# Patient Record
Sex: FEMALE | Race: WHITE | NOT HISPANIC OR LATINO | Employment: UNEMPLOYED | URBAN - METROPOLITAN AREA
[De-identification: names, ages, dates, MRNs, and addresses within clinical notes are randomized per-mention and may not be internally consistent; named-entity substitution may affect disease eponyms.]

---

## 2017-03-05 DIAGNOSIS — M79.671 CHRONIC PAIN IN RIGHT FOOT: ICD-10-CM

## 2017-03-05 DIAGNOSIS — G89.29 CHRONIC PAIN IN RIGHT FOOT: ICD-10-CM

## 2017-04-05 ENCOUNTER — HOSPITAL ENCOUNTER (OUTPATIENT)
Dept: RADIOLOGY | Facility: MEDICAL CENTER | Age: 62
End: 2017-04-05
Attending: NURSE PRACTITIONER
Payer: COMMERCIAL

## 2017-04-05 DIAGNOSIS — S92.324A CLOSED NONDISPLACED FRACTURE OF SECOND METATARSAL BONE OF RIGHT FOOT, INITIAL ENCOUNTER: ICD-10-CM

## 2017-04-05 DIAGNOSIS — M25.374 INSTABILITY OF RIGHT FOOT JOINT: ICD-10-CM

## 2017-04-05 DIAGNOSIS — M79.671 RIGHT FOOT PAIN: ICD-10-CM

## 2017-04-05 PROCEDURE — 73700 CT LOWER EXTREMITY W/O DYE: CPT | Mod: RT

## 2017-05-11 ENCOUNTER — APPOINTMENT (OUTPATIENT)
Dept: ADMISSIONS | Facility: MEDICAL CENTER | Age: 62
End: 2017-05-11
Attending: ORTHOPAEDIC SURGERY
Payer: COMMERCIAL

## 2017-05-11 RX ORDER — IBUPROFEN 200 MG
200 TABLET ORAL EVERY 6 HOURS PRN
COMMUNITY

## 2017-05-15 ENCOUNTER — APPOINTMENT (OUTPATIENT)
Dept: RADIOLOGY | Facility: MEDICAL CENTER | Age: 62
End: 2017-05-15
Attending: ORTHOPAEDIC SURGERY
Payer: COMMERCIAL

## 2017-05-15 ENCOUNTER — HOSPITAL ENCOUNTER (OUTPATIENT)
Facility: MEDICAL CENTER | Age: 62
End: 2017-05-15
Attending: ORTHOPAEDIC SURGERY | Admitting: ORTHOPAEDIC SURGERY
Payer: COMMERCIAL

## 2017-05-15 VITALS
TEMPERATURE: 98.3 F | BODY MASS INDEX: 28.38 KG/M2 | HEART RATE: 80 BPM | WEIGHT: 166.23 LBS | RESPIRATION RATE: 16 BRPM | OXYGEN SATURATION: 98 % | HEIGHT: 64 IN

## 2017-05-15 PROBLEM — S92.324A NONDISPLACED FRACTURE OF SECOND METATARSAL BONE, RIGHT FOOT, INITIAL ENCOUNTER FOR CLOSED FRACTURE: Status: ACTIVE | Noted: 2017-05-15

## 2017-05-15 PROBLEM — M25.374: Status: ACTIVE | Noted: 2017-05-15

## 2017-05-15 PROBLEM — M79.671 FOOT PAIN, RIGHT: Status: ACTIVE | Noted: 2017-05-15

## 2017-05-15 PROCEDURE — 700111 HCHG RX REV CODE 636 W/ 250 OVERRIDE (IP)

## 2017-05-15 PROCEDURE — 160022 HCHG BLOCK: Performed by: ORTHOPAEDIC SURGERY

## 2017-05-15 PROCEDURE — 501480 HCHG STOCKINETTE: Performed by: ORTHOPAEDIC SURGERY

## 2017-05-15 PROCEDURE — 160048 HCHG OR STATISTICAL LEVEL 1-5: Performed by: ORTHOPAEDIC SURGERY

## 2017-05-15 PROCEDURE — 160028 HCHG SURGERY MINUTES - 1ST 30 MINS LEVEL 3: Performed by: ORTHOPAEDIC SURGERY

## 2017-05-15 PROCEDURE — 160046 HCHG PACU - 1ST 60 MINS PHASE II: Performed by: ORTHOPAEDIC SURGERY

## 2017-05-15 PROCEDURE — 500423 HCHG DRESSING, ABD COMBINE: Performed by: ORTHOPAEDIC SURGERY

## 2017-05-15 PROCEDURE — 500088 HCHG BLADE, SAGITTAL: Performed by: ORTHOPAEDIC SURGERY

## 2017-05-15 PROCEDURE — 160035 HCHG PACU - 1ST 60 MINS PHASE I: Performed by: ORTHOPAEDIC SURGERY

## 2017-05-15 PROCEDURE — 700101 HCHG RX REV CODE 250

## 2017-05-15 PROCEDURE — 160002 HCHG RECOVERY MINUTES (STAT): Performed by: ORTHOPAEDIC SURGERY

## 2017-05-15 PROCEDURE — 160009 HCHG ANES TIME/MIN: Performed by: ORTHOPAEDIC SURGERY

## 2017-05-15 PROCEDURE — C1713 ANCHOR/SCREW BN/BN,TIS/BN: HCPCS | Performed by: ORTHOPAEDIC SURGERY

## 2017-05-15 PROCEDURE — 700102 HCHG RX REV CODE 250 W/ 637 OVERRIDE(OP)

## 2017-05-15 PROCEDURE — 160039 HCHG SURGERY MINUTES - EA ADDL 1 MIN LEVEL 3: Performed by: ORTHOPAEDIC SURGERY

## 2017-05-15 PROCEDURE — 502240 HCHG MISC OR SUPPLY RC 0272: Performed by: ORTHOPAEDIC SURGERY

## 2017-05-15 PROCEDURE — 160036 HCHG PACU - EA ADDL 30 MINS PHASE I: Performed by: ORTHOPAEDIC SURGERY

## 2017-05-15 PROCEDURE — 500881 HCHG PACK, EXTREMITY: Performed by: ORTHOPAEDIC SURGERY

## 2017-05-15 PROCEDURE — 160047 HCHG PACU  - EA ADDL 30 MINS PHASE II: Performed by: ORTHOPAEDIC SURGERY

## 2017-05-15 PROCEDURE — 160025 RECOVERY II MINUTES (STATS): Performed by: ORTHOPAEDIC SURGERY

## 2017-05-15 PROCEDURE — A6454 SELF-ADHER BAND W>=3" <5"/YD: HCPCS | Performed by: ORTHOPAEDIC SURGERY

## 2017-05-15 PROCEDURE — 73630 X-RAY EXAM OF FOOT: CPT | Mod: RT

## 2017-05-15 PROCEDURE — 502000 HCHG MISC OR IMPLANTS RC 0278: Performed by: ORTHOPAEDIC SURGERY

## 2017-05-15 PROCEDURE — A6223 GAUZE >16<=48 NO W/SAL W/O B: HCPCS | Performed by: ORTHOPAEDIC SURGERY

## 2017-05-15 PROCEDURE — 501838 HCHG SUTURE GENERAL: Performed by: ORTHOPAEDIC SURGERY

## 2017-05-15 PROCEDURE — A9270 NON-COVERED ITEM OR SERVICE: HCPCS

## 2017-05-15 DEVICE — SCREW CORTEX SELF TAPPING VLP STERILE 3.5MMX26MM (3TX6+2TX4=26): Type: IMPLANTABLE DEVICE | Status: FUNCTIONAL

## 2017-05-15 DEVICE — IMPLANTABLE DEVICE: Type: IMPLANTABLE DEVICE | Status: FUNCTIONAL

## 2017-05-15 DEVICE — SCREW CORTEX SELF TAPPING VLP STERILE 3.5MMX40MM (3TX6+2TX4=26): Type: IMPLANTABLE DEVICE | Status: FUNCTIONAL

## 2017-05-15 DEVICE — SCREW CORTEX SELF TAPPING VLP STERILE 3.5MMX34MM (3TX6+2TX4=26): Type: IMPLANTABLE DEVICE | Status: FUNCTIONAL

## 2017-05-15 RX ORDER — MAGNESIUM HYDROXIDE 1200 MG/15ML
LIQUID ORAL
Status: DISCONTINUED | OUTPATIENT
Start: 2017-05-15 | End: 2017-05-15 | Stop reason: HOSPADM

## 2017-05-15 RX ORDER — LIDOCAINE HYDROCHLORIDE 10 MG/ML
INJECTION, SOLUTION INFILTRATION; PERINEURAL
Status: COMPLETED
Start: 2017-05-15 | End: 2017-05-15

## 2017-05-15 RX ORDER — LIDOCAINE HYDROCHLORIDE 10 MG/ML
0.5 INJECTION, SOLUTION INFILTRATION; PERINEURAL
Status: COMPLETED | OUTPATIENT
Start: 2017-05-15 | End: 2017-05-15

## 2017-05-15 RX ORDER — OXYCODONE HCL 5 MG/5 ML
SOLUTION, ORAL ORAL
Status: COMPLETED
Start: 2017-05-15 | End: 2017-05-15

## 2017-05-15 RX ORDER — LIDOCAINE HYDROCHLORIDE 10 MG/ML
0.5 INJECTION, SOLUTION INFILTRATION; PERINEURAL
Status: CANCELLED | OUTPATIENT
Start: 2017-05-15

## 2017-05-15 RX ORDER — SODIUM CHLORIDE, SODIUM LACTATE, POTASSIUM CHLORIDE, CALCIUM CHLORIDE 600; 310; 30; 20 MG/100ML; MG/100ML; MG/100ML; MG/100ML
INJECTION, SOLUTION INTRAVENOUS CONTINUOUS
Status: DISCONTINUED | OUTPATIENT
Start: 2017-05-15 | End: 2017-05-15 | Stop reason: HOSPADM

## 2017-05-15 RX ORDER — LIDOCAINE AND PRILOCAINE 25; 25 MG/G; MG/G
1 CREAM TOPICAL
Status: COMPLETED | OUTPATIENT
Start: 2017-05-15 | End: 2017-05-15

## 2017-05-15 RX ORDER — LIDOCAINE AND PRILOCAINE 25; 25 MG/G; MG/G
1 CREAM TOPICAL
Status: CANCELLED | OUTPATIENT
Start: 2017-05-15

## 2017-05-15 RX ADMIN — OXYCODONE HYDROCHLORIDE 5 MG: 5 SOLUTION ORAL at 11:08

## 2017-05-15 RX ADMIN — OXYCODONE HYDROCHLORIDE 5 MG: 5 SOLUTION ORAL at 12:02

## 2017-05-15 RX ADMIN — LIDOCAINE HYDROCHLORIDE 0.5 ML: 10 INJECTION, SOLUTION INFILTRATION; PERINEURAL at 07:24

## 2017-05-15 RX ADMIN — SODIUM CHLORIDE, SODIUM LACTATE, POTASSIUM CHLORIDE, CALCIUM CHLORIDE: 600; 310; 30; 20 INJECTION, SOLUTION INTRAVENOUS at 07:24

## 2017-05-15 ASSESSMENT — PAIN SCALES - GENERAL
PAINLEVEL_OUTOF10: 2
PAINLEVEL_OUTOF10: 0
PAINLEVEL_OUTOF10: 2
PAINLEVEL_OUTOF10: 0
PAINLEVEL_OUTOF10: 1
PAINLEVEL_OUTOF10: 3
PAINLEVEL_OUTOF10: 0
PAINLEVEL_OUTOF10: 2
PAINLEVEL_OUTOF10: 0

## 2017-05-15 NOTE — OR NURSING
Pt A&Ox4. VSS on RA. Pt sitting up in [place], using IS. Denies nausea. Pt c/o discomfort in right thigh that is tolerable. Report called to GARY Zuleta in PACU II and pt transferred.

## 2017-05-15 NOTE — IP AVS SNAPSHOT
5/15/2017    María Ruelas Bernadine  [address] [place] [address]    Dear María:    [facility] wants to ensure your discharge home is safe and you or your loved ones have had all of your questions answered regarding your care after you leave the hospital.    Below is a list of resources and contact information should you have any questions regarding your hospital stay, follow-up instructions, or active medical symptoms.    Questions or Concerns Regarding… Contact   Medical Questions Related to Your Discharge  (7 days a week, 8am-5pm) Contact a Nurse Care Coordinator   [phone]   Medical Questions Not Related to Your Discharge  (24 hours a day / 7 days a week)  Contact the Nurse Health Line   [phone]    Medications or Discharge Instructions Refer to your discharge packet   or contact your [facility] Primary Care Provider   [phone]   Follow-up Appointment(s) Schedule your appointment via [employer]   or contact Scheduling [phone]   Billing Review your statement via [employer]  or contact Billing [phone]   Medical Records Review your records via [employer]   or contact Medical Records [phone]     You may receive a telephone call within two days of discharge. This call is to make certain you understand your discharge instructions and have the opportunity to have any questions answered. You can also easily access your medical information, test results and upcoming appointments via the [employer] free online health management tool. You can learn more and sign up at [employer]. For assistance setting up your [employer] account, please call [phone].    Once again, we want to ensure your discharge home is safe and that you have a clear understanding of any next steps in your care. If you have any questions or concerns, please do not hesitate to contact us, we are here for you. Thank you for choosing [facility] for your healthcare needs.    Sincerely,    Your [facility] Healthcare Team

## 2017-05-15 NOTE — IP AVS SNAPSHOT
" Home Care Instructions                                                                                                                Name:María Colindres  Medical Record Number:[id number]  CSN: [id number]    YOB: 1955   Age: 61 y.o.  Sex: female  HT:1.626 m (5' 4\") WT: 75.4 kg (166 lb 3.6 oz)          Admit Date: 5/15/2017     Discharge Date:   Today's Date: 5/15/2017  Attending Doctor:  Andrade Harrison M.D.                  Allergies:  Codeine; Food; and Latex              Follow-up Information     1. Follow up with Andrade Harrison M.D. In 2 weeks.    Specialty:  Orthopaedics    Contact information    [address]  [address]  [facility] [address]  [phone]          Discharge Instructions         ACTIVITY: Rest and take it easy for the first 24 hours.  A responsible adult is recommended to remain with you during that time.  It is normal to feel sleepy.  We encourage you to not do anything that requires balance, judgment or coordination.    MILD FLU-LIKE SYMPTOMS ARE NORMAL. YOU MAY EXPERIENCE GENERALIZED MUSCLE ACHES, THROAT IRRITATION, HEADACHE AND/OR SOME NAUSEA.    FOR 24 HOURS DO NOT:  Drive, operate machinery or run household appliances.  Drink beer or alcoholic beverages.   Make important decisions or sign legal documents.    SPECIAL INSTRUCTIONS:   Elevate/ice  rewrap ace looser 6-8 hours post op  Take pain medications scheduled until block wears off.  Hold for sedation.             DIET: To avoid nausea, slowly advance diet as tolerated, avoiding spicy or greasy foods for the first day.  Add more substantial food to your diet according to your physician's instructions.  Babies can be fed formula or breast milk as soon as they are hungry.  INCREASE FLUIDS AND FIBER TO AVOID CONSTIPATION.        FOLLOW-UP APPOINTMENT:  A follow-up appointment should be arranged with your doctor call to schedule.    You should CALL YOUR PHYSICIAN if you develop:  Fever greater than 101 degrees F.  Pain not relieved " by medication, or persistent nausea or vomiting.  Excessive bleeding (blood soaking through dressing) or unexpected drainage from the wound.  Extreme redness or swelling around the incision site, drainage of pus or foul smelling drainage.  Inability to urinate or empty your bladder within 8 hours.  Problems with breathing or chest pain.    You should call 911 if you develop problems with breathing or chest pain.  If you are unable to contact your doctor or surgical center, you should go to the nearest emergency room or urgent care center.  Physician's telephone #: [phone]    If any questions arise, call your doctor.  If your doctor is not available, please feel free to call the Surgical Center at [phone].  The Center is open Monday through Friday from 7AM to 7PM.  You can also call the [employer] HOTLINE open 24 hours/day, 7 days/week and speak to a nurse at [phone], or toll free at [phone].    A registered nurse may call you a few days after your surgery to see how you are doing after your procedure.    MEDICATIONS: Resume taking daily medication.  Take prescribed pain medication with food.  If no medication is prescribed, you may take non-aspirin pain medication if needed.  PAIN MEDICATION CAN BE VERY CONSTIPATING.  Take a stool softener or laxative such as senokot, pericolace, or milk of magnesia if needed.    Prescription given for pain Last pain medication given at 5252.    If your physician has prescribed pain medication that includes Acetaminophen (Tylenol), do not take additional Acetaminophen (Tylenol) while taking the prescribed medication.    Depression / Suicide Risk    As you are discharged from this [facility] facility, it is important to learn how to keep safe from harming yourself.    Recognize the warning signs:  · Abrupt changes in personality, positive or negative- including increase in energy   · Giving away possessions  · Change in eating patterns- significant weight changes-   positive or negative  · Change in sleeping patterns- unable to sleep or sleeping all the time   · Unwillingness or inability to communicate  · Depression  · Unusual sadness, discouragement and loneliness  · Talk of wanting to die  · Neglect of personal appearance   · Rebelliousness- reckless behavior  · Withdrawal from people/activities they love  · Confusion- inability to concentrate     If you or a loved one observes any of these behaviors or has concerns about self-harm, here's what you can do:  · Talk about it- your feelings and reasons for harming yourself  · Remove any means that you might use to hurt yourself (examples: pills, rope, extension cords, firearm)  · Get professional help from the community (Mental Health, Substance Abuse, psychological counseling)  · Do not be alone:Call your Safe Contact- someone whom you trust who will be there for you.  · Call your local CRISIS HOTLINE 507-0195 or 391-251-2814  · Call your local Children's Mobile Crisis Response Team [place] (380) 414-6945 or www.[employer]  · Call the toll free National Suicide Prevention Hotlines   · National Suicide Prevention Lifeline 080-002-LSBU (7401)  · National Hope Line Network 800-SUICIDE (470-2821)       Medication List      CONTINUE taking these medications        Instructions    Morning Afternoon Evening Bedtime    ibuprofen 200 MG Tabs   Commonly known as:  MOTRIN        Take 200 mg by mouth every 6 hours as needed.   Dose:  200 mg                        XIIDRA 5 % Soln   Generic drug:  Lifitegrast        Place 1 Drop in both eyes 2 Times a Day.   Dose:  1 Drop                                Medication Information     Above and/or attached are the medications your physician expects you to take upon discharge. Review all of your home medications and newly ordered medications with your doctor and/or pharmacist. Follow medication instructions as directed by your doctor and/or pharmacist. Please keep your medication list  with you and share with your physician. Update the information when medications are discontinued, doses are changed, or new medications (including over-the-counter products) are added; and carry medication information at all times in the event of emergency situations.        Resources     Quit Smoking / Tobacco Use:    I understand the use of any tobacco products increases my chance of suffering from future heart disease or stroke and could cause other illnesses which may shorten my life. Quitting the use of tobacco products is the single most important thing I can do to improve my health. For further information on smoking / tobacco cessation call a Toll Free Quit Line at 1-986.736.3375 (*National Cancer [place]) or 1-352.972.6866 (American Lung Association) or you can access the web based program at www.lungusa.org.    [place] Tobacco Users Help Line:  (260) 374-2840       Toll Free: 1-454.814.4372  Quit Tobacco Program [facility] (098)925-2633    Crisis Hotline:    [place] Crisis Hotline:  8-679-QQISBSI or 1-396.731.7157    [place] Crisis Hotline:    1-376.982.6957 or 201-467-5103    Discharge Survey:   Thank you for choosing [facility]. We hope we did everything we could to make your hospital stay a pleasant one. You may be receiving a survey and we would appreciate your time and participation in answering the questions. Your input is very valuable to us in our efforts to improve our service to our patients and their families.            Signatures     My signature on this form indicates that:    1. I acknowledge receipt and understanding of these Home Care Instruction.  2. My questions regarding this information have been answered to my satisfaction.  3. I have formulated a plan with my discharge nurse to obtain my prescribed medications for home.    __________________________________      __________________________________                   Patient Signature                                  Guardian/Responsible Adult Signature      __________________________________                 __________       ________                       Nurse Signature                                               Date                 Time

## 2017-05-15 NOTE — DISCHARGE INSTRUCTIONS
ACTIVITY: Rest and take it easy for the first 24 hours.  A responsible adult is recommended to remain with you during that time.  It is normal to feel sleepy.  We encourage you to not do anything that requires balance, judgment or coordination.    MILD FLU-LIKE SYMPTOMS ARE NORMAL. YOU MAY EXPERIENCE GENERALIZED MUSCLE ACHES, THROAT IRRITATION, HEADACHE AND/OR SOME NAUSEA.    FOR 24 HOURS DO NOT:  Drive, operate machinery or run household appliances.  Drink beer or alcoholic beverages.   Make important decisions or sign legal documents.    SPECIAL INSTRUCTIONS:   Elevate/ice  rewrap ace looser 6-8 hours post op  Take pain medications scheduled until block wears off.  Hold for sedation.             DIET: To avoid nausea, slowly advance diet as tolerated, avoiding spicy or greasy foods for the first day.  Add more substantial food to your diet according to your physician's instructions.  Babies can be fed formula or breast milk as soon as they are hungry.  INCREASE FLUIDS AND FIBER TO AVOID CONSTIPATION.        FOLLOW-UP APPOINTMENT:  A follow-up appointment should be arranged with your doctor call to schedule.    You should CALL YOUR PHYSICIAN if you develop:  Fever greater than 101 degrees F.  Pain not relieved by medication, or persistent nausea or vomiting.  Excessive bleeding (blood soaking through dressing) or unexpected drainage from the wound.  Extreme redness or swelling around the incision site, drainage of pus or foul smelling drainage.  Inability to urinate or empty your bladder within 8 hours.  Problems with breathing or chest pain.    You should call 911 if you develop problems with breathing or chest pain.  If you are unable to contact your doctor or surgical center, you should go to the nearest emergency room or urgent care center.  Physician's telephone #: [phone]    If any questions arise, call your doctor.  If your doctor is not available, please feel free to call the Surgical Center at  [phone].  The Center is open Monday through Friday from 7AM to 7PM.  You can also call the HEALTH HOTLINE open 24 hours/day, 7 days/week and speak to a nurse at [phone], or toll free at [phone].    A registered nurse may call you a few days after your surgery to see how you are doing after your procedure.    MEDICATIONS: Resume taking daily medication.  Take prescribed pain medication with food.  If no medication is prescribed, you may take non-aspirin pain medication if needed.  PAIN MEDICATION CAN BE VERY CONSTIPATING.  Take a stool softener or laxative such as senokot, pericolace, or milk of magnesia if needed.    Prescription given for pain Last pain medication given at 1202.    If your physician has prescribed pain medication that includes Acetaminophen (Tylenol), do not take additional Acetaminophen (Tylenol) while taking the prescribed medication.    Depression / Suicide Risk    As you are discharged from this [facility] facility, it is important to learn how to keep safe from harming yourself.    Recognize the warning signs:  · Abrupt changes in personality, positive or negative- including increase in energy   · Giving away possessions  · Change in eating patterns- significant weight changes-  positive or negative  · Change in sleeping patterns- unable to sleep or sleeping all the time   · Unwillingness or inability to communicate  · Depression  · Unusual sadness, discouragement and loneliness  · Talk of wanting to die  · Neglect of personal appearance   · Rebelliousness- reckless behavior  · Withdrawal from people/activities they love  · Confusion- inability to concentrate     If you or a loved one observes any of these behaviors or has concerns about self-harm, here's what you can do:  · Talk about it- your feelings and reasons for harming yourself  · Remove any means that you might use to hurt yourself (examples: pills, rope, extension cords, firearm)  · Get professional help from  the community (Mental Health, Substance Abuse, psychological counseling)  · Do not be alone:Call your Safe Contact- someone whom you trust who will be there for you.  · Call your local CRISIS HOTLINE 565-4805 or 228-147-0642  · Call your local Children's Mobile Crisis Response Team [place] (619) 675-8628 or www.[employer]  · Call the toll free National Suicide Prevention Hotlines   · National Suicide Prevention Lifeline 100-879-BTZI (6416)  · National Hope Line Network 800-SUICIDE (487-1778)

## 2017-05-15 NOTE — OP REPORT
DATE OF SERVICE:  05/15/2017    DATE OF OPERATION:  05/15/2017    PREOPERATIVE DIAGNOSIS:  Right chronic Lisfranc dislocation.    POSTOPERATIVE DIAGNOSIS:  Right chronic Lisfranc dislocation.    PROCEDURE PERFORMED:  1.  Right open reduction and internal fixation Lisfranc disruption.  2.  Right fusion, first and second tarsometatarsal joint.  3.  Right minor bone grafting.    SURGEON:  Andrade Harrison MD    ASSISTANT:  Cristine Barney MD    SECOND ASSISTANT:  JAYLYN No    ANESTHESIA:  General endotracheal with popliteal block per my request for   postoperative pain management.    ESTIMATED BLOOD LOSS:  None.    COMPLICATIONS:  None.    POSTOPERATIVE PLAN:  1.  Nonweightbearing.  2.  Preoperative antibiotics.  3.  Follow up in 2 weeks.    INDICATIONS:  The patient is a pleasant 61-year-old female who has had   problems with her right foot following a trauma.  The above diagnosis was made   and options were discussed with her including operative and nonoperative.    She elected to undergo operative intervention.  The above procedure discussed   and all questions were answered.  Risks of surgery explained to include but   not limited to wound problems, infection, nerve injury, vascular injury, need   for further surgery.  She understands she could have persistent risk for pain,   malunion, nonunion, need for further surgery.  She understands and accepts   these risks and agrees to proceed.  Site was marked by myself prior to   receiving psychotropic medicines.    PROCEDURE IN DETAIL:  The patient was given a popliteal block per my request   for postoperative pain management.  She was brought into the operating room   and underwent general endotracheal anesthesia without complications.  Right   lower extremity was prepped and draped in standard fashion.  Positive site   verification confirmed her right lower extremity as well as the above   procedure and confirmation that she received preoperative  antibiotics.    Esmarch was used to exsanguinate her foot and ankle and leg tourniquet was   inflated to 250 mmHg.  Curvilinear incision was made over the dorsal aspect of   her foot.  It was carefully dissected down, freeing up soft tissue.  The EDB   was identified and elevated up.  This was then identified the underlying   neurovascular structures.  These were mobilized.  A 15 blade was then used to   release the soft tissue over the first tarsometatarsal Lisfranc and second   tarsometatarsal.  Using curette and osteotomes, all cartilage was removed.  It   is down to good subchondral bone.  All soft tissue was removed.  The wounds   were then morselized.  The Lisfranc was then reduced as confirmed on C-arm   imaging of the first TMT was reduced.  It was provisionally held with K wire.    A 3.5 screw was placed in lag screw fashion from the first metatarsal into   the medial cuneiform.  Second lag screw was placed across the Lisfranc from   the base of the second into the medial cuneiform.  It had excellent fixation.    Finally, P[employer]a [employer]hew VLP X plate was placed from the medial, middle,   base of the second base of the first metatarsal.  All had excellent fixation.    Final C-arm imaging demonstrates satisfactory position for internal fixation   alignment of the foot.  Wounds were irrigated with copious irrigation.  A stab   incision was then made over the calcaneus.  It was bluntly dissected down.    Using a 4.5 drill sleeve, multiple cores of bone were taken from the heel and   placed.  These were morselized and placed into and around the fusion site.    Wounds were irrigated with copious irrigation and closed in layered fashion   with 3-0 Vicryl and 3-0 nylon.  Sterile dressings were applied.  Tourniquet   was released.  All toes were pink.  She was transferred to recovery room in   good condition.    Utilization of Dr. Barney was necessary for patient positioning, holding,   retracting, provisional and  definitive fixation, wound closure, dressing and   splint placement.  She was present throughout the entire procedure.       ____________________________________     MD AUSTYN FLOREZ / DONNA    DD:  05/15/2017 10:55:54  DT:  05/15/2017 13:26:33    D#:  [id number]  Job#:  [id number]    cc: [facility]

## 2017-10-23 ENCOUNTER — APPOINTMENT (OUTPATIENT)
Dept: RADIOLOGY | Facility: MEDICAL CENTER | Age: 62
End: 2017-10-23
Attending: EMERGENCY MEDICINE
Payer: COMMERCIAL

## 2017-10-23 ENCOUNTER — OFFICE VISIT (OUTPATIENT)
Dept: URGENT CARE | Facility: PHYSICIAN GROUP | Age: 62
End: 2017-10-23
Payer: COMMERCIAL

## 2017-10-23 ENCOUNTER — HOSPITAL ENCOUNTER (EMERGENCY)
Facility: MEDICAL CENTER | Age: 62
End: 2017-10-23
Attending: EMERGENCY MEDICINE
Payer: COMMERCIAL

## 2017-10-23 VITALS
HEIGHT: 63 IN | SYSTOLIC BLOOD PRESSURE: 153 MMHG | DIASTOLIC BLOOD PRESSURE: 75 MMHG | WEIGHT: 162.7 LBS | RESPIRATION RATE: 18 BRPM | BODY MASS INDEX: 28.83 KG/M2 | OXYGEN SATURATION: 96 % | TEMPERATURE: 98 F | HEART RATE: 81 BPM

## 2017-10-23 VITALS
HEART RATE: 95 BPM | BODY MASS INDEX: 27.66 KG/M2 | HEIGHT: 64 IN | DIASTOLIC BLOOD PRESSURE: 98 MMHG | OXYGEN SATURATION: 98 % | TEMPERATURE: 102.5 F | RESPIRATION RATE: 16 BRPM | WEIGHT: 162 LBS | SYSTOLIC BLOOD PRESSURE: 152 MMHG

## 2017-10-23 DIAGNOSIS — R10.9 FLANK PAIN: ICD-10-CM

## 2017-10-23 DIAGNOSIS — R19.7 DIARRHEA OF PRESUMED INFECTIOUS ORIGIN: ICD-10-CM

## 2017-10-23 LAB
ALBUMIN SERPL BCP-MCNC: 4 G/DL (ref 3.2–4.9)
ALBUMIN/GLOB SERPL: 1.3 G/DL
ALP SERPL-CCNC: 101 U/L (ref 30–99)
ALT SERPL-CCNC: 80 U/L (ref 2–50)
ANION GAP SERPL CALC-SCNC: 7 MMOL/L (ref 0–11.9)
APPEARANCE UR: ABNORMAL
APPEARANCE UR: CLEAR
AST SERPL-CCNC: 47 U/L (ref 12–45)
BACTERIA #/AREA URNS HPF: ABNORMAL /HPF
BASOPHILS # BLD AUTO: 1.1 % (ref 0–1.8)
BASOPHILS # BLD: 0.08 K/UL (ref 0–0.12)
BILIRUB SERPL-MCNC: 0.5 MG/DL (ref 0.1–1.5)
BILIRUB UR QL STRIP.AUTO: NEGATIVE
BILIRUB UR STRIP-MCNC: NORMAL MG/DL
BUN SERPL-MCNC: 17 MG/DL (ref 8–22)
CALCIUM SERPL-MCNC: 9.2 MG/DL (ref 8.4–10.2)
CHLORIDE SERPL-SCNC: 105 MMOL/L (ref 96–112)
CO2 SERPL-SCNC: 24 MMOL/L (ref 20–33)
COLOR UR AUTO: YELLOW
COLOR UR: YELLOW
CREAT SERPL-MCNC: 0.98 MG/DL (ref 0.5–1.4)
EOSINOPHIL # BLD AUTO: 0.22 K/UL (ref 0–0.51)
EOSINOPHIL NFR BLD: 3.1 % (ref 0–6.9)
EPI CELLS #/AREA URNS HPF: ABNORMAL /HPF
ERYTHROCYTE [DISTWIDTH] IN BLOOD BY AUTOMATED COUNT: 40.3 FL (ref 35.9–50)
GFR SERPL CREATININE-BSD FRML MDRD: 58 ML/MIN/1.73 M 2
GLOBULIN SER CALC-MCNC: 3.2 G/DL (ref 1.9–3.5)
GLUCOSE SERPL-MCNC: 111 MG/DL (ref 65–99)
GLUCOSE UR STRIP.AUTO-MCNC: NEGATIVE MG/DL
GLUCOSE UR STRIP.AUTO-MCNC: NORMAL MG/DL
HCT VFR BLD AUTO: 45.5 % (ref 37–47)
HGB BLD-MCNC: 15.6 G/DL (ref 12–16)
IMM GRANULOCYTES # BLD AUTO: 0.01 K/UL (ref 0–0.11)
IMM GRANULOCYTES NFR BLD AUTO: 0.1 % (ref 0–0.9)
KETONES UR STRIP.AUTO-MCNC: NEGATIVE MG/DL
KETONES UR STRIP.AUTO-MCNC: NORMAL MG/DL
LEUKOCYTE ESTERASE UR QL STRIP.AUTO: NEGATIVE
LEUKOCYTE ESTERASE UR QL STRIP.AUTO: NORMAL
LIPASE SERPL-CCNC: 29 U/L (ref 7–58)
LYMPHOCYTES # BLD AUTO: 2.44 K/UL (ref 1–4.8)
LYMPHOCYTES NFR BLD: 34.3 % (ref 22–41)
MCH RBC QN AUTO: 30.4 PG (ref 27–33)
MCHC RBC AUTO-ENTMCNC: 34.3 G/DL (ref 33.6–35)
MCV RBC AUTO: 88.5 FL (ref 81.4–97.8)
MICRO URNS: ABNORMAL
MONOCYTES # BLD AUTO: 0.66 K/UL (ref 0–0.85)
MONOCYTES NFR BLD AUTO: 9.3 % (ref 0–13.4)
MUCOUS THREADS #/AREA URNS HPF: ABNORMAL /HPF
NEUTROPHILS # BLD AUTO: 3.7 K/UL (ref 2–7.15)
NEUTROPHILS NFR BLD: 52.1 % (ref 44–72)
NITRITE UR QL STRIP.AUTO: NEGATIVE
NITRITE UR QL STRIP.AUTO: NORMAL
NRBC # BLD AUTO: 0 K/UL
NRBC BLD AUTO-RTO: 0 /100 WBC
PH UR STRIP.AUTO: 6 [PH]
PH UR STRIP.AUTO: 6.5 [PH] (ref 5–8)
PLATELET # BLD AUTO: 297 K/UL (ref 164–446)
PMV BLD AUTO: 8.8 FL (ref 9–12.9)
POTASSIUM SERPL-SCNC: 3.5 MMOL/L (ref 3.6–5.5)
PROT SERPL-MCNC: 7.2 G/DL (ref 6–8.2)
PROT UR QL STRIP: NEGATIVE MG/DL
PROT UR QL STRIP: NORMAL MG/DL
RBC # BLD AUTO: 5.14 M/UL (ref 4.2–5.4)
RBC # URNS HPF: ABNORMAL /HPF
RBC UR QL AUTO: ABNORMAL
RBC UR QL AUTO: NORMAL
SODIUM SERPL-SCNC: 136 MMOL/L (ref 135–145)
SP GR UR STRIP.AUTO: 1
SP GR UR STRIP.AUTO: <=1.005
UROBILINOGEN UR STRIP-MCNC: NORMAL MG/DL
WBC # BLD AUTO: 7.1 K/UL (ref 4.8–10.8)
WBC #/AREA URNS HPF: ABNORMAL /HPF

## 2017-10-23 PROCEDURE — 700117 HCHG RX CONTRAST REV CODE 255: Performed by: EMERGENCY MEDICINE

## 2017-10-23 PROCEDURE — 99284 EMERGENCY DEPT VISIT MOD MDM: CPT

## 2017-10-23 PROCEDURE — 81001 URINALYSIS AUTO W/SCOPE: CPT

## 2017-10-23 PROCEDURE — 83690 ASSAY OF LIPASE: CPT

## 2017-10-23 PROCEDURE — 99204 OFFICE O/P NEW MOD 45 MIN: CPT | Performed by: EMERGENCY MEDICINE

## 2017-10-23 PROCEDURE — 36415 COLL VENOUS BLD VENIPUNCTURE: CPT

## 2017-10-23 PROCEDURE — 85025 COMPLETE CBC W/AUTO DIFF WBC: CPT

## 2017-10-23 PROCEDURE — 700105 HCHG RX REV CODE 258: Performed by: EMERGENCY MEDICINE

## 2017-10-23 PROCEDURE — 74177 CT ABD & PELVIS W/CONTRAST: CPT

## 2017-10-23 PROCEDURE — 80053 COMPREHEN METABOLIC PANEL: CPT

## 2017-10-23 PROCEDURE — 81002 URINALYSIS NONAUTO W/O SCOPE: CPT | Performed by: EMERGENCY MEDICINE

## 2017-10-23 RX ORDER — SODIUM CHLORIDE 9 MG/ML
1000 INJECTION, SOLUTION INTRAVENOUS ONCE
Status: COMPLETED | OUTPATIENT
Start: 2017-10-23 | End: 2017-10-23

## 2017-10-23 RX ADMIN — SODIUM CHLORIDE 1000 ML: 9 INJECTION, SOLUTION INTRAVENOUS at 18:15

## 2017-10-23 RX ADMIN — IOHEXOL 100 ML: 350 INJECTION, SOLUTION INTRAVENOUS at 19:45

## 2017-10-23 ASSESSMENT — ENCOUNTER SYMPTOMS
PALPITATIONS: 0
COUGH: 0
EYE REDNESS: 0
NAUSEA: 0
NECK PAIN: 0
BLOOD IN STOOL: 0
HEADACHES: 0
ABDOMINAL PAIN: 1
SENSORY CHANGE: 0
FEVER: 1
CONSTIPATION: 0
DIARRHEA: 0
HEMOPTYSIS: 0
EYE DISCHARGE: 0
MYALGIAS: 0
CHILLS: 0

## 2017-10-23 ASSESSMENT — PAIN SCALES - GENERAL: PAINLEVEL_OUTOF10: 3

## 2017-10-23 NOTE — PROGRESS NOTES
Subjective:      María Colindres is a 62 y.o. female who presents with Back Pain (lower left back pain )            HPI  Patient is a pleasant 62-year-old female complaining of left back and left lower quadrant abdominal pain. Patient states the pain has been associated with a generalized ill feeling. She did not note she had a temperature 102.3.  Patient denies any vomiting or diarrhea. She has generalized discomfort to moderate range. Patient has medical history is significant for hysterectomy. She also has a past medical history of multiple episodes of adhesions.  Allergies   Allergen Reactions   • Codeine      nausea   • Food      Wine: severe headache/migraine   • Latex      From previous surgery; possible adhesions     Social History     Social History   • Marital status:      Spouse name: N/A   • Number of children: N/A   • Years of education: N/A     Occupational History   • Not on file.     Social History Main Topics   • Smoking status: Never Smoker   • Smokeless tobacco: Never Used   • Alcohol use Yes      Comment: 1 per week   • Drug use: No   • Sexual activity: Yes     Partners: Male     Other Topics Concern   • Not on file     Social History Narrative   • No narrative on file     Past Medical History:   Diagnosis Date   • Abnormal mammogram 7/09    microcalcifications - atypical ductal hyperplasia   • Arthritis 5-2017    back, hands, neck   • Cataract    • Chronic low back pain    • Chronic neck pain    • Dyslipidemia    • Heart burn    • Increased liver enzymes     fatty infiltration of liver per u/s     Current Outpatient Prescriptions on File Prior to Visit   Medication Sig Dispense Refill   • Lifitegrast (XIIDRA) 5 % Solution Place 1 Drop in both eyes 2 Times a Day.     • ibuprofen (MOTRIN) 200 MG Tab Take 200 mg by mouth every 6 hours as needed.       No current facility-administered medications on file prior to visit.      Family History   Problem Relation Age of Onset   • Hypertension  "Mother    • Hyperlipidemia Mother    • Cancer Father      throat   • Diabetes Sister      Review of Systems   Constitutional: Positive for fever. Negative for chills.   Eyes: Negative for discharge and redness.   Respiratory: Negative for cough and hemoptysis.    Cardiovascular: Negative for chest pain and palpitations.   Gastrointestinal: Positive for abdominal pain. Negative for blood in stool, constipation, diarrhea and nausea.   Genitourinary: Negative for dysuria, frequency and urgency.   Musculoskeletal: Negative for myalgias and neck pain.   Skin: Negative for rash.   Neurological: Negative for sensory change and headaches.          Objective:     /98   Pulse 95   Temp (!) 39.2 °C (102.5 °F)   Resp 16   Ht 1.626 m (5' 4\")   Wt 73.5 kg (162 lb)   SpO2 98%   Breastfeeding? No   BMI 27.81 kg/m²      Physical Exam   Constitutional: She appears well-developed and well-nourished. She appears distressed.   HENT:   Head: Normocephalic and atraumatic.   Right Ear: External ear normal.   Eyes: Conjunctivae are normal. Right eye exhibits no discharge. Left eye exhibits no discharge. No scleral icterus.   Cardiovascular: Normal rate.    Pulmonary/Chest: Breath sounds normal.   Neurological: She is alert.   Skin: Skin is dry. She is not diaphoretic.   Psychiatric: She has a normal mood and affect. Her behavior is normal.             Point of care urine dip negative  Assessment/Plan:     Diagnosis 1. left lower quadrant abdominal pain                        2. Febrile illness     I called and spoke to the on-duty emergency physician at [facility] emergency department for more than willing to evaluate the patient. They want her to stay nothing by mouth    The patient is status post hysterectomy with remaining O nephrectomy history of fibroids. Her urine is clear. I feel the differential diagnosis would involve diverticulitis which would require further workup and we can perform in the urgent care at this " time.

## 2017-10-24 NOTE — ED NOTES
"Pt states was sent from UC for possible \"Diverticulitis,\" currently denies c/o pain states \"I'm feeling better.\"  Discussed POC including PIV and labs, denied questions/concerns.    "

## 2017-10-24 NOTE — ED NOTES
Discussed POC w/ pt including pending CT.  Pt aware need a urine sample, stated will notify RN when able to void.    Report to GARY Pierre.

## 2017-10-24 NOTE — ED NOTES
Patient reports abdominal pain, left flank pain, and diarrhea X1 day. Patient sent to ED from Urgent care.

## 2017-10-24 NOTE — DISCHARGE INSTRUCTIONS
Abdominal Pain, Women  Abdominal (stomach, pelvic, or belly) pain can be caused by many things. It is important to tell your doctor:  · The location of the pain.  · Does it come and go or is it present all the time?  · Are there things that start the pain (eating certain foods, exercise)?  · Are there other symptoms associated with the pain (fever, nausea, vomiting, diarrhea)?  All of this is helpful to know when trying to find the cause of the pain.  CAUSES   · Stomach: virus or bacteria infection, or ulcer.  · Intestine: appendicitis (inflamed appendix), regional ileitis (Crohn's disease), ulcerative colitis (inflamed colon), irritable bowel syndrome, diverticulitis (inflamed diverticulum of the colon), or cancer of the stomach or intestine.  · Gallbladder disease or stones in the gallbladder.  · Kidney disease, kidney stones, or infection.  · Pancreas infection or cancer.  · Fibromyalgia (pain disorder).  · Diseases of the female organs:  · Uterus: fibroid (non-cancerous) tumors or infection.  · Fallopian tubes: infection or tubal pregnancy.  · Ovary: cysts or tumors.  · Pelvic adhesions (scar tissue).  · Endometriosis (uterus lining tissue growing in the pelvis and on the pelvic organs).  · Pelvic congestion syndrome (female organs filling up with blood just before the menstrual period).  · Pain with the menstrual period.  · Pain with ovulation (producing an egg).  · Pain with an IUD (intrauterine device, birth control) in the uterus.  · Cancer of the female organs.  · Functional pain (pain not caused by a disease, may improve without treatment).  · Psychological pain.  · Depression.  DIAGNOSIS   Your doctor will decide the seriousness of your pain by doing an examination.  · Blood tests.  · X-rays.  · Ultrasound.  · CT scan (computed tomography, special type of X-ray).  · MRI (magnetic resonance imaging).  · Cultures, for infection.  · Barium enema (dye inserted in the large intestine, to better view it with  X-rays).  · Colonoscopy (looking in intestine with a lighted tube).  · Laparoscopy (minor surgery, looking in abdomen with a lighted tube).  · Major abdominal exploratory surgery (looking in abdomen with a large incision).  TREATMENT   The treatment will depend on the cause of the pain.   · Many cases can be observed and treated at home.  · Over-the-counter medicines recommended by your caregiver.  · Prescription medicine.  · Antibiotics, for infection.  · Birth control pills, for painful periods or for ovulation pain.  · Hormone treatment, for endometriosis.  · Nerve blocking injections.  · Physical therapy.  · Antidepressants.  · Counseling with a psychologist or psychiatrist.  · Minor or major surgery.  HOME CARE INSTRUCTIONS   · Do not take laxatives, unless directed by your caregiver.  · Take over-the-counter pain medicine only if ordered by your caregiver. Do not take aspirin because it can cause an upset stomach or bleeding.  · Try a clear liquid diet (broth or water) as ordered by your caregiver. Slowly move to a bland diet, as tolerated, if the pain is related to the stomach or intestine.  · Have a thermometer and take your temperature several times a day, and record it.  · Bed rest and sleep, if it helps the pain.  · Avoid sexual intercourse, if it causes pain.  · Avoid stressful situations.  · Keep your follow-up appointments and tests, as your caregiver orders.  · If the pain does not go away with medicine or surgery, you may try:  · Acupuncture.  · Relaxation exercises (yoga, meditation).  · Group therapy.  · Counseling.  SEEK MEDICAL CARE IF:   · You notice certain foods cause stomach pain.  · Your home care treatment is not helping your pain.  · You need stronger pain medicine.  · You want your IUD removed.  · You feel faint or lightheaded.  · You develop nausea and vomiting.  · You develop a rash.  · You are having side effects or an allergy to your medicine.  SEEK IMMEDIATE MEDICAL CARE IF:   · Your  pain does not go away or gets worse.  · You have a fever.  · Your pain is felt only in portions of the abdomen. The right side could possibly be appendicitis. The left lower portion of the abdomen could be colitis or diverticulitis.  · You are passing blood in your stools (bright red or black tarry stools, with or without vomiting).  · You have blood in your urine.  · You develop chills, with or without a fever.  · You pass out.  MAKE SURE YOU:   · Understand these instructions.  · Will watch your condition.  · Will get help right away if you are not doing well or get worse.  Document Released: 10/14/2008 Document Revised: 03/11/2013 Document Reviewed: 11/04/2010  [employer]® Patient Information ©2014 [employer].

## 2017-10-24 NOTE — ED PROVIDER NOTES
ED Provider Note    CHIEF COMPLAINT  Chief Complaint   Patient presents with   • Sent from Urgent Care   • Flank Pain     left   • Diarrhea   • Abdominal Pain       HPI  María Colindres is a 62 y.o. female who presentsFor evaluation of reported fevers left flank pain with left lower abdominal pain. Patient reported fever of 102 earlier in the day. She took ibuprofen or to arrival. She was seen earlier today at urgent care. They apparently did a point-of-care urinalysis which is negative and they're worried about some sort of intra-abdominal process such as colitis or diverticulitis etc. She was referred here for higher level of care. She reports dull aching pain per minute and left flank radiates anteriorly. No history of kidney stones or diverticulosis. She has no report of productive cough headache neck stiffness or rash. She did report fever 102 earlier in the day. No associated rash no hematochezia melena or hematemesis. Movement tends to make the discomfort worse    REVIEW OF SYSTEMS  See HPI for further details. No lethargy cyanosis apnea hemoptysis night sweats weight loss headache All other systems are negative.     PAST MEDICAL HISTORY  Past Medical History:   Diagnosis Date   • Arthritis 5-2017    back, hands, neck   • Abnormal mammogram 7/09    microcalcifications - atypical ductal hyperplasia   • Cataract    • Chronic low back pain    • Chronic neck pain    • Dyslipidemia    • Heart burn    • Increased liver enzymes     fatty infiltration of liver per u/s       FAMILY HISTORY  No history of bleeding disorder     SOCIAL HISTORY  Social History     Social History   • Marital status:      Spouse name: N/A   • Number of children: N/A   • Years of education: N/A     Social History Main Topics   • Smoking status: Never Smoker   • Smokeless tobacco: Never Used   • Alcohol use Yes      Comment: 1 per week   • Drug use: No   • Sexual activity: Yes     Partners: Male     Other Topics Concern   • Not on  "file     Social History Narrative   • No narrative on file     Nonsmoker occasional alcohol  SURGICAL HISTORY  Past Surgical History:   Procedure Laterality Date   • TOE FUSION Right 5/15/2017    Procedure: TOE FUSION - 1ST AND 2ND TARSOMETATARSAL ;  Surgeon: Andrade Harrison M.D.;  Location: SURGERY [facility];  Service:    • LIGAMENT REPAIR  5/15/2017    Procedure: LIGAMENT REPAIR OPEN REDUCTION LIS FRANC LIGAMENT;  Surgeon: Andrade Harrison M.D.;  Location: SURGERY [facility];  Service:    • BREAST BIOPSY  7/8/2009    Performed by TATYANA MCKEE at SURGERY SAME DAY [facility] ORS   • CERVICAL DISK AND FUSION ANTERIOR  2/18/2009    Performed by RIVERA HERNANDEZ at SURGERY [facility]   • COLONOSCOPY  2004    hemorrhoids   • ABDOMINAL HYSTERECTOMY TOTAL      due to fibroids, tubes and ovaries not removed   • EYE SURGERY      RK both eyes   • OTHER ABDOMINAL SURGERY      laparoscopic surgery for adhesions   • RETINAL DETACHMENT REPAIR         CURRENT MEDICATIONS  No current facility-administered medications for this encounter.     Current Outpatient Prescriptions:   •  Lifitegrast (XIIDRA) 5 % Solution, Place 1 Drop in both eyes 2 Times a Day., Disp: , Rfl:   •  ibuprofen (MOTRIN) 200 MG Tab, Take 200 mg by mouth every 6 hours as needed., Disp: , Rfl:       ALLERGIES  Allergies   Allergen Reactions   • Codeine      nausea   • Food      Wine: severe headache/migraine   • Latex      From previous surgery; possible adhesions       PHYSICAL EXAM  VITAL SIGNS: /75   Pulse 78   Temp 36.7 °C (98 °F)   Resp 18   Ht 1.6 m (5' 3\")   Wt 73.8 kg (162 lb 11.2 oz)   SpO2 98%   BMI 28.82 kg/m²  Room air O2: 96    Constitutional: Well developed, Well nourished, No acute distress, Non-toxic appearance.   HENT: Normocephalic, Atraumatic, Bilateral external ears normal, Oropharynx moist, No oral exudates, Nose normal.   Eyes: PERRLA, EOMI, Conjunctiva normal, No discharge.   Neck: Normal range of motion, No " tenderness, Supple, No stridor.   Cardiovascular: Normal heart rate, Normal rhythm, No murmurs, No rubs, No gallops.   Thorax & Lungs: Normal breath sounds, No respiratory distress, No wheezing, No chest tenderness.   Abdomen: Bowel sounds normal, Soft,Dull left lower quadrant tenderness no hernias or masses no rebound guarding or rigidity   Skin: Warm, Dry, No erythema, No rash.   Back:Left-sided CVA tenderness.   Extremities: Intact distal pulses, No edema, No tenderness, No cyanosis, No clubbing.   Musculoskeletal: Good range of motion in all major joints. No tenderness to palpation or major deformities noted.   Neurologic: Alert & oriented x 3, Normal motor function, Normal sensory function, No focal deficits noted.   Psychiatric: Affect normal, Judgment normal, Mood normal.         RADIOLOGY/PROCEDURES  Results for orders placed or performed during the hospital encounter of 10/23/17   URINALYSIS   Result Value Ref Range    Color Yellow     Character Clear     Specific Gravity <=1.005 <1.035    Ph 6.0 5.0 - 8.0    Glucose Negative Negative mg/dL    Ketones Negative Negative mg/dL    Protein Negative Negative mg/dL    Bilirubin Negative Negative    Nitrite Negative Negative    Leukocyte Esterase Negative Negative    Occult Blood Trace (A) Negative    Micro Urine Req Microscopic    CBC WITH DIFFERENTIAL   Result Value Ref Range    WBC 7.1 4.8 - 10.8 K/uL    RBC 5.14 4.20 - 5.40 M/uL    Hemoglobin 15.6 12.0 - 16.0 g/dL    Hematocrit 45.5 37.0 - 47.0 %    MCV 88.5 81.4 - 97.8 fL    MCH 30.4 27.0 - 33.0 pg    MCHC 34.3 33.6 - 35.0 g/dL    RDW 40.3 35.9 - 50.0 fL    Platelet Count 297 164 - 446 K/uL    MPV 8.8 (L) 9.0 - 12.9 fL    Neutrophils-Polys 52.10 44.00 - 72.00 %    Lymphocytes 34.30 22.00 - 41.00 %    Monocytes 9.30 0.00 - 13.40 %    Eosinophils 3.10 0.00 - 6.90 %    Basophils 1.10 0.00 - 1.80 %    Immature Granulocytes 0.10 0.00 - 0.90 %    Nucleated RBC 0.00 /100 WBC    Neutrophils (Absolute) 3.70 2.00 -  7.15 K/uL    Lymphs (Absolute) 2.44 1.00 - 4.80 K/uL    Monos (Absolute) 0.66 0.00 - 0.85 K/uL    Eos (Absolute) 0.22 0.00 - 0.51 K/uL    Baso (Absolute) 0.08 0.00 - 0.12 K/uL    Immature Granulocytes (abs) 0.01 0.00 - 0.11 K/uL    NRBC (Absolute) 0.00 K/uL   COMP METABOLIC PANEL   Result Value Ref Range    Sodium 136 135 - 145 mmol/L    Potassium 3.5 (L) 3.6 - 5.5 mmol/L    Chloride 105 96 - 112 mmol/L    Co2 24 20 - 33 mmol/L    Anion Gap 7.0 0.0 - 11.9    Glucose 111 (H) 65 - 99 mg/dL    Bun 17 8 - 22 mg/dL    Creatinine 0.98 0.50 - 1.40 mg/dL    Calcium 9.2 8.4 - 10.2 mg/dL    AST(SGOT) 47 (H) 12 - 45 U/L    ALT(SGPT) 80 (H) 2 - 50 U/L    Alkaline Phosphatase 101 (H) 30 - 99 U/L    Total Bilirubin 0.5 0.1 - 1.5 mg/dL    Albumin 4.0 3.2 - 4.9 g/dL    Total Protein 7.2 6.0 - 8.2 g/dL    Globulin 3.2 1.9 - 3.5 g/dL    A-G Ratio 1.3 g/dL   LIPASE   Result Value Ref Range    Lipase 29 7 - 58 U/L   ESTIMATED GFR   Result Value Ref Range    GFR If African American >60 >60 mL/min/1.73 m 2    GFR If Non African American 58 (A) >60 mL/min/1.73 m 2      COURSE & MEDICAL DECISION MAKING  Pertinent Labs & Imaging studies reviewed. (See chart for details)  The patient presented here with subjective fevers apparent left flank and left lower abdominal pain. Differential diagnosis was extensive including diverticulitis, colitis, UTI, pyelonephritis kidney stone, muscular skeletal pain. Here she had an extended workup. Laboratory studies including CBC are normal. There is no evidence of bandemia or anemia. She does have some mild transaminitis but serial abdominal exams reveal no epigastric and no right upper quadrant discomfort. Her urinalysis is negative for blood or infection. Subsequent CT scan with contrast of her abdomen and pelvis does not demonstrates any obstructive or inflammatory process. Here the patient does not have high fever or leukocytosis. She did not require any parenteral pain medication. I advised her to take  some IV Profen and Tylenol and keep a close sinus. Follow up either here with PCP in one to 2 days if symptoms progress or worsen    FINAL IMPRESSION  1. Left flank pain unclear etiology  2. Left lower quadrant pain unclear etiology      Electronically signed by: Salazar Jean Baptiste, 10/23/2017 5:40 PM

## 2017-11-15 ENCOUNTER — OFFICE VISIT (OUTPATIENT)
Dept: MEDICAL GROUP | Facility: PHYSICIAN GROUP | Age: 62
End: 2017-11-15
Payer: COMMERCIAL

## 2017-11-15 VITALS
WEIGHT: 161 LBS | BODY MASS INDEX: 28.53 KG/M2 | HEIGHT: 63 IN | SYSTOLIC BLOOD PRESSURE: 138 MMHG | HEART RATE: 82 BPM | RESPIRATION RATE: 16 BRPM | DIASTOLIC BLOOD PRESSURE: 90 MMHG | TEMPERATURE: 97.1 F | OXYGEN SATURATION: 98 %

## 2017-11-15 DIAGNOSIS — Z12.11 SCREENING FOR COLON CANCER: ICD-10-CM

## 2017-11-15 DIAGNOSIS — D22.9 NEVUS: ICD-10-CM

## 2017-11-15 DIAGNOSIS — M54.2 CHRONIC NECK PAIN: ICD-10-CM

## 2017-11-15 DIAGNOSIS — K76.0 HEPATIC STEATOSIS: ICD-10-CM

## 2017-11-15 DIAGNOSIS — R74.8 ELEVATED LIVER ENZYMES: ICD-10-CM

## 2017-11-15 DIAGNOSIS — R19.7 DIARRHEA, UNSPECIFIED TYPE: ICD-10-CM

## 2017-11-15 DIAGNOSIS — N94.10 DYSPAREUNIA IN FEMALE: ICD-10-CM

## 2017-11-15 DIAGNOSIS — G89.29 CHRONIC NECK PAIN: ICD-10-CM

## 2017-11-15 DIAGNOSIS — Z12.39 BREAST CANCER SCREENING: ICD-10-CM

## 2017-11-15 PROBLEM — M79.671 FOOT PAIN, RIGHT: Status: RESOLVED | Noted: 2017-05-15 | Resolved: 2017-11-15

## 2017-11-15 PROBLEM — S92.324A NONDISPLACED FRACTURE OF SECOND METATARSAL BONE, RIGHT FOOT, INITIAL ENCOUNTER FOR CLOSED FRACTURE: Status: RESOLVED | Noted: 2017-05-15 | Resolved: 2017-11-15

## 2017-11-15 PROBLEM — M25.374: Status: RESOLVED | Noted: 2017-05-15 | Resolved: 2017-11-15

## 2017-11-15 PROCEDURE — 99204 OFFICE O/P NEW MOD 45 MIN: CPT | Performed by: INTERNAL MEDICINE

## 2017-11-15 RX ORDER — CIPROFLOXACIN 500 MG/1
500 TABLET, FILM COATED ORAL 2 TIMES DAILY
Qty: 14 TAB | Refills: 0 | Status: SHIPPED | OUTPATIENT
Start: 2017-11-15 | End: 2018-07-24

## 2017-11-15 RX ORDER — METRONIDAZOLE 500 MG/1
500 TABLET ORAL 3 TIMES DAILY
Qty: 21 TAB | Refills: 0 | Status: SHIPPED | OUTPATIENT
Start: 2017-11-15 | End: 2018-07-24

## 2017-11-15 ASSESSMENT — PATIENT HEALTH QUESTIONNAIRE - PHQ9: CLINICAL INTERPRETATION OF PHQ2 SCORE: 0

## 2017-11-15 NOTE — PATIENT INSTRUCTIONS
· You have been prescribed antibiotics. If this significantly worsens your abdominal pain and/or diarrhea please let us know. DO NOT DRINK ANY ALCOHOL WHILE ON THESE MEDICATIONS  · You have been given a referral to Dr. Richardson  · You have been prescribed a vaginal estrogen cream  · Please have your fasting blood work done at your convenience   · You have been given a referral to dermatology  · A mammogram and colonoscopy were ordered. You shouldn't get the colonoscopy while you are still on antibiotics

## 2017-11-15 NOTE — PROGRESS NOTES
"PRIMARY CARE CLINIC NEW PATIENT H&P  Chief Complaint   Patient presents with   • Hospital Follow-up   • Diarrhea   • Fever   • Numbness     in hands    • Nevus     back of L leg    • Dyspareunia       History of Present Illness     Diarrhea  Has had diarrhea off and on for the past year. Symptoms are intermittent, has bouts that self resolve. At the time of her visit to the ER 10/23/2017 she had an extensive work up including CT a/p and lab work negative for infection and colitis. Also associated with left flank pain. At the time she was seen in urgent care on 10/23/17 she had also had a fever but that had resolved since she presented to the ER later. Reports intermittent low grade fevers at home. Denies blood in stool, nausea, vomiting, black stools. Associated with left lower quadrant pain that's worse with bowel movements. Her stools aren't solid; went 3 times this morning. Nothing she can note makes it better or worse. Last colonoscopy was 10 years ago and was told \"she had the colon of a 89 yo\"; no polyps were removed. Says that she has had adhesions since her hysterectomy. Appetite has been fair, has been tolerating 3 meals a day.     Chronic Neck Pain  Has had a neck surgery (2009) with Dr. Richardson at [facility] when she had symptoms of cervical radiculopathy. Her surgery initially resolved her hand numbness until recently when it has recurred. Has numbness of both hands; left more than right.     Hepatic steatosis  Doesn't eat a lot of fried, fatty foods. Not very active. Her LDL was last checked in 2011 at which time it was 137. Has been on statins in the past but stopped taking them at the time of her breast surgery (cannot recall the date).     Dyspareunia in female  Has been having dyspareunia for the past year. Hasn't tried lubricants or any other measures.     Pablo  Has noted a mole at the back of her left thigh that's been rubbing against her clothes for the past 6 months. Isn't sure if she had " had it before since it wasn't rubbing against her clothes.       Current Outpatient Prescriptions   Medication Sig Dispense Refill   • ciprofloxacin (CIPRO) 500 MG Tab Take 1 Tab by mouth 2 times a day. 14 Tab 0   • metronidazole (FLAGYL) 500 MG Tab Take 1 Tab by mouth 3 times a day. 21 Tab 0   • conjugated estrogen (PREMARIN) 0.625 MG/GM Cream Use daily 1 Tube 2   • Lifitegrast (XIIDRA) 5 % Solution Place 1 Drop in both eyes 2 Times a Day.     • ibuprofen (MOTRIN) 200 MG Tab Take 200 mg by mouth every 6 hours as needed.       No current facility-administered medications for this visit.        Past Medical History:   Diagnosis Date   • Abnormal mammogram 7/09    microcalcifications - atypical ductal hyperplasia   • Arthritis 5-2017    back, hands, neck   • Cataract    • Chronic low back pain    • Chronic neck pain    • Dyslipidemia    • Hepatic steatosis 11/15/2017     Past Surgical History:   Procedure Laterality Date   • TOE FUSION Right 5/15/2017    Procedure: TOE FUSION - 1ST AND 2ND TARSOMETATARSAL ;  Surgeon: Andrade Harrison M.D.;  Location: SURGERY [facility];  Service:    • LIGAMENT REPAIR  5/15/2017    Procedure: LIGAMENT REPAIR OPEN REDUCTION LIS FRANC LIGAMENT;  Surgeon: Andrade Harrison M.D.;  Location: SURGERY [facility];  Service:    • BREAST BIOPSY  7/8/2009    Performed by TATYANA MCKEE at SURGERY SAME DAY [facility]S   • CERVICAL DISK AND FUSION ANTERIOR  2/18/2009    Performed by RIVERA HERNANDEZ at SURGERY [facility]   • COLONOSCOPY  2004    hemorrhoids   • ABDOMINAL HYSTERECTOMY TOTAL      due to fibroids, tubes and ovaries not removed   • EYE SURGERY      RK both eyes   • OTHER ABDOMINAL SURGERY      laparoscopic surgery for adhesions   • RETINAL DETACHMENT REPAIR       Social History   Substance Use Topics   • Smoking status: Never Smoker   • Smokeless tobacco: Never Used   • Alcohol use 0.6 oz/week     1 Cans of beer per week     Social History     Social History Narrative     "Retired       Family History   Problem Relation Age of Onset   • Hypertension Mother    • Hyperlipidemia Mother    • Other Mother      thyroid disease s/p surgery   • Cancer Father      throat; diagnosed late 50s   • Diabetes Sister    • Other Sister      glaucoma   • Other Sister      horseshoe kidney      Family Status   Relation Status   • Mother Alive, age 88y    thyroid dis s/p thyroidectomy   • Father    • Sister Alive   • Brother Alive   • Sister Alive   • Sister Alive     Allergies: Codeine; Food; and Latex    ROS  Constitutional: Negative for fatigue/generalized weakness. Positive for intermittent subjective low grade fevers  HEENT: Negative for  vision changes, hearing changes    Respiratory: Negative for shortness of breath  Cardiovascular: Negative for chest pain, palpitations  Gastrointestinal: Negative for blood in stool, constipation. Positive for diarrhea and LLQ pain  Genitourinary: Negative for dysuria, polyuria  Musculoskeletal: Negative for myalgias, back pain, and joint pain. Positive for neck pain  Skin: Positive for mole back of left thigh  Neurological: positive for numbness of bilateral hands/fingertips  Psychiatric/Behavioral: Negative for depression, anxiety      Objective   Blood pressure 138/90, pulse 82, temperature 36.2 °C (97.1 °F), resp. rate 16, height 1.6 m (5' 3\"), weight 73 kg (161 lb), SpO2 98 %. Body mass index is 28.52 kg/m².    General: Alert, oriented. In no acute distress   HEET: EOMI, PERRL, conjunctiva non-injected, sclera non-icteric.  Nares patent with no significant congestion or drainage.  Miriam pinnae, external auditory canals, TM pearly gray with normal light reflex bilaterally.Oral mucous membranes pink and moist with no lesions.  Neck: supple with no cervical, subclavicular lymphadenopathy, JVD, palpable thyroid nodules   Lungs: clear to auscultation bilaterally with good excursion.  CV: regular rate and rhythm.  Abdomen soft, non-distended, " non-tender with normal bowel sounds. No hepatosplenomegaly, no masses palpated  Skin: warm, dry. Raised symmetric nevus back of right left thigh    Psychiatric: appropriate mood and affect     Assessment and Plan   The following treatment plan was discussed     1. Hepatic steatosis  Noted on CT a/p from ED on 10/23/17 and associated with transaminitis. Will recheck her lipids as they were last checked 2011 and elevated. Further work up as below (see elevated liver enzymes).   - VITAMIN D,25 HYDROXY; Future  - LIPID PROFILE; Future    2. Screening for colon cancer  Due for repeat colonoscopy (has been 10 years); ordered today but instructed not to have this done while on antibiotics.     3. Diarrhea, unspecified type  Her symptoms (change in bowel habits, LLQ pain, fevers, including documented fever of 102 at urgent care 10/23/17) seems clinically consistent with diverticulitis although this wasn't picked up on the formal read of her CT a/p in the ED on 10/23/17. May be worth an empiric treatment with po antibiotics for diverticulitis however informed her to let us know if this worsens her diarrhea. Will also need follow up screening colonoscopy as it has been 10 years since her last.   - REFERRAL TO GI FOR COLONOSCOPY  - ciprofloxacin (CIPRO) 500 MG Tab; Take 1 Tab by mouth 2 times a day.  Dispense: 14 Tab; Refill: 0  - metronidazole (FLAGYL) 500 MG Tab; Take 1 Tab by mouth 3 times a day.  Dispense: 21 Tab; Refill: 0    4. Chronic neck pain  Has had cervical radiculopathy before and surgery (done by Dr. Richardson) helped her symptoms then. She was told that if symptoms recur she may need another surgery. Bilateral hand numbness has now recurred and she should see Dr. Richardson again who is familiar with her case and anatomy from the first surgery.   - REFERRAL TO NEUROSURGERY    5. Dyspareunia in female  Denies hot flashes. Advised to use lubricants during intercourse and topical estrogen cream.   - conjugated estrogen  (PREMARIN) 0.625 MG/GM Cream; Use daily  Dispense: 1 Tube; Refill: 2    6. Breast cancer screening  Mammogram ordered.   - [id number]; Future    7. Elevated liver enzymes  Hepatic steatosis from CT a/p 10/23 however needs hepatitis and alk phos isoenzymes worked up before attributing transaminitis to fatty liver alone.   - CBC WITH DIFFERENTIAL; Future  - COMP METABOLIC PANEL; Future  - HEP B SURFACE AB; Future  - HEP B SURFACE ANTIGEN; Future  - HEP B CORE AB IGM; Future  - HEP C VIRUS ANTIBODY; Future  - ALKALINE PHOSPHATASE ISOENZYMES  - CERULOPLASMIN; Future    8. Nevus  Nevus of back of left thigh appears benign on exam however now raised for past 6 months and rubbing against her clothes. Will refer to dermatology to determine if this should be biopsied.   - REFERRAL TO DERMATOLOGY      Return in about 2 months (around 1/15/2018).    Health Maintenance      Health Maintenance Due   Topic Date Due   • COLONOSCOPY  10/14/2005   • MAMMOGRAM  08/09/2011   • IMM ZOSTER VACCINE  10/14/2015       Gilberto Alexander MD  Internal Medicine  [facility]

## 2017-11-15 NOTE — ASSESSMENT & PLAN NOTE
"Has had diarrhea off and on for the past year. Symptoms are intermittent, has bouts that self resolve. At the time of her visit to the ER 10/23/2017 she had an extensive work up including CT a/p and lab work negative for infection and colitis. Also associated with left flank pain. At the time she was seen in urgent care on 10/23/17 she had also had a fever but that had resolved since she presented to the ER later. Reports intermittent low grade fevers at home. Denies blood in stool, nausea, vomiting, black stools. Associated with left lower quadrant pain that's worse with bowel movements. Her stools aren't solid; went 3 times this morning. Nothing she can note makes it better or worse. Last colonoscopy was 10 years ago and was told \"she had the colon of a 89 yo\"; no polyps were removed. Says that she has had adhesions since her hysterectomy. Appetite has been fair, has been tolerating 3 meals a day.   "

## 2017-11-15 NOTE — ASSESSMENT & PLAN NOTE
Has noted a mole at the back of her left thigh that's been rubbing against her clothes for the past 6 months. Isn't sure if she had had it before since it wasn't rubbing against her clothes.

## 2017-11-15 NOTE — ASSESSMENT & PLAN NOTE
Doesn't eat a lot of fried, fatty foods. Not very active. Her LDL was last checked in 2011 at which time it was 137. Has been on statins in the past but stopped taking them at the time of her breast surgery (cannot recall the date).

## 2017-11-15 NOTE — ASSESSMENT & PLAN NOTE
Has had a neck surgery (2009) with Dr. Richardson at [facility] when she had symptoms of cervical radiculopathy. Her surgery initially resolved her hand numbness until recently when it has recurred. Has numbness of both hands; left more than right.

## 2017-11-21 ENCOUNTER — HOSPITAL ENCOUNTER (OUTPATIENT)
Dept: RADIOLOGY | Facility: MEDICAL CENTER | Age: 62
End: 2017-11-21
Attending: INTERNAL MEDICINE
Payer: COMMERCIAL

## 2017-11-21 DIAGNOSIS — Z12.31 VISIT FOR SCREENING MAMMOGRAM: ICD-10-CM

## 2017-11-21 PROCEDURE — G0202 SCR MAMMO BI INCL CAD: HCPCS

## 2017-12-13 ENCOUNTER — OFFICE VISIT (OUTPATIENT)
Dept: DERMATOLOGY | Facility: IMAGING CENTER | Age: 62
End: 2017-12-13
Payer: COMMERCIAL

## 2017-12-13 DIAGNOSIS — L82.0 SEBORRHEIC KERATOSES, INFLAMED: ICD-10-CM

## 2017-12-13 PROCEDURE — 99999 PR NO CHARGE: CPT | Performed by: NURSE PRACTITIONER

## 2017-12-13 PROCEDURE — 17110 DESTRUCTION B9 LES UP TO 14: CPT | Performed by: NURSE PRACTITIONER

## 2017-12-13 NOTE — ASSESSMENT & PLAN NOTE
Patient complains of lesions on her lower extremities and left upper extremity that itch and cause distress and interrupt sleeping. She would like treated.

## 2017-12-13 NOTE — PROGRESS NOTES
Chief Complaint   Patient presents with   • Seborrheic Keratosis         HISTORY OF THE PRESENT ILLNESS: Patient is a 62 y.o. female.Primary care provider is Dr. Alexander This pleasant patient is here today regarding skin lesions on her upper and lower extremities. She also requests a skin check      Seborrheic keratoses, inflamed  Patient complains of lesions on her lower extremities and left upper extremity that itch and cause distress and interrupt sleeping. She would like to have these treated.      Allergies: Codeine; Food; and Latex    Current Outpatient Prescriptions   Medication Sig Dispense Refill   • ciprofloxacin (CIPRO) 500 MG Tab Take 1 Tab by mouth 2 times a day. 14 Tab 0   • metronidazole (FLAGYL) 500 MG Tab Take 1 Tab by mouth 3 times a day. 21 Tab 0   • conjugated estrogen (PREMARIN) 0.625 MG/GM Cream Use daily 1 Tube 2   • Lifitegrast (XIIDRA) 5 % Solution Place 1 Drop in both eyes 2 Times a Day.     • ibuprofen (MOTRIN) 200 MG Tab Take 200 mg by mouth every 6 hours as needed.       No current facility-administered medications for this visit.        Past Medical History:   Diagnosis Date   • Abnormal mammogram 7/09    microcalcifications - atypical ductal hyperplasia   • Arthritis 5-2017    back, hands, neck   • Cataract    • Chronic low back pain    • Chronic neck pain    • Dyslipidemia    • Hepatic steatosis 11/15/2017   • Seborrheic keratoses, inflamed 12/13/2017       Past Surgical History:   Procedure Laterality Date   • TOE FUSION Right 5/15/2017    Procedure: TOE FUSION - 1ST AND 2ND TARSOMETATARSAL ;  Surgeon: Andrade Harrison M.D.;  Location: SURGERY [facility];  Service:    • LIGAMENT REPAIR  5/15/2017    Procedure: LIGAMENT REPAIR OPEN REDUCTION LIS FRANC LIGAMENT;  Surgeon: Andrade Harrison M.D.;  Location: SURGERY [facility];  Service:    • BREAST BIOPSY  7/8/2009    Performed by TATYANA MCKEE at SURGERY SAME DAY [facility]   • CERVICAL DISK AND FUSION ANTERIOR  2/18/2009     Performed by RIVERA HERNANDEZ at SURGERY [facility]   • COLONOSCOPY  2004    hemorrhoids   • ABDOMINAL HYSTERECTOMY TOTAL      due to fibroids, tubes and ovaries not removed   • EYE SURGERY      RK both eyes   • OTHER ABDOMINAL SURGERY      laparoscopic surgery for adhesions   • RETINAL DETACHMENT REPAIR         Social History   Substance Use Topics   • Smoking status: Never Smoker   • Smokeless tobacco: Never Used   • Alcohol use 0.6 oz/week     1 Cans of beer per week       Family Status   Relation Status   • Mother Alive, age 88y    thyroid dis s/p thyroidectomy   • Father    • Sister    • Brother Alive   • Sister Alive   • Sister Alive     Family History   Problem Relation Age of Onset   • Hypertension Mother    • Hyperlipidemia Mother    • Other Mother      thyroid disease s/p surgery   • Cancer Father      throat; diagnosed late 50s   • Diabetes Sister    • Breast Cancer Sister    • Other Sister      glaucoma   • Other Sister      horseshoe kidney        Review of Systems   Constitutional: Negative for fever, chills, weight loss and malaise/fatigue.   Skin: Skin lesions on her upper and lower extremities that are causing distress      Exam: There were no vitals taken for this visit.  General: Normal appearing. No distress.  An examination was performed including the scalp( including the hair) , head and face, inspection of eyelids, lips , right and left ear externally but not ear canals.  Neck and chest and back.  Including  Both upper and lower extremities, including hands and feet and nails and between fingers and toes.   Patient refused examination under her bra and refused examination of the buttocks and pelvic area.  All areas were found to be within normal limits except as noted in the description below.     Lentigo on the face  Scattered cherry angiomas over the torso  Seborrheic keratosis on the left upper extremity  The shoulder  Seborrheic keratosis on the lower extremity primarily  on the left thigh and lower calf. Patient requests treatment with liquid nitrogen she understands this can cause hypo-or hyperpigmentation, persistent erythema and blistering.  5 seborrheic keratosis are treated with liquid nitrogen.                  Please note that this dictation was created using voice recognition software. I have made every reasonable attempt to correct obvious errors, but I expect that there are errors of grammar and possibly content that I did not discover before finalizing the note.      Assessment/Plan  1. Seborrheic keratoses, inflamed  We discussed these are persistent hereditary lesions that will need more than one treatment. She is encouraged to use sunscreen and protective clothing, continue to monitor skin and return for any concerning lesions.

## 2018-03-29 ENCOUNTER — HOSPITAL ENCOUNTER (OUTPATIENT)
Dept: LAB | Facility: MEDICAL CENTER | Age: 63
End: 2018-03-29
Attending: INTERNAL MEDICINE
Payer: COMMERCIAL

## 2018-03-29 DIAGNOSIS — K76.0 HEPATIC STEATOSIS: ICD-10-CM

## 2018-03-29 DIAGNOSIS — R74.8 ELEVATED LIVER ENZYMES: ICD-10-CM

## 2018-03-29 LAB
25(OH)D3 SERPL-MCNC: 15 NG/ML (ref 30–100)
ALBUMIN SERPL BCP-MCNC: 4.4 G/DL (ref 3.2–4.9)
ALBUMIN SERPL BCP-MCNC: 4.4 G/DL (ref 3.2–4.9)
ALBUMIN/GLOB SERPL: 1.4 G/DL
ALBUMIN/GLOB SERPL: 1.4 G/DL
ALP SERPL-CCNC: 83 U/L (ref 30–99)
ALP SERPL-CCNC: 87 U/L (ref 30–99)
ALT SERPL-CCNC: 34 U/L (ref 2–50)
ALT SERPL-CCNC: 35 U/L (ref 2–50)
ANION GAP SERPL CALC-SCNC: 8 MMOL/L (ref 0–11.9)
ANION GAP SERPL CALC-SCNC: 9 MMOL/L (ref 0–11.9)
AST SERPL-CCNC: 23 U/L (ref 12–45)
AST SERPL-CCNC: 24 U/L (ref 12–45)
BASOPHILS # BLD AUTO: 0.8 % (ref 0–1.8)
BASOPHILS # BLD AUTO: 0.8 % (ref 0–1.8)
BASOPHILS # BLD: 0.04 K/UL (ref 0–0.12)
BASOPHILS # BLD: 0.04 K/UL (ref 0–0.12)
BILIRUB SERPL-MCNC: 0.7 MG/DL (ref 0.1–1.5)
BILIRUB SERPL-MCNC: 0.7 MG/DL (ref 0.1–1.5)
BUN SERPL-MCNC: 19 MG/DL (ref 8–22)
BUN SERPL-MCNC: 19 MG/DL (ref 8–22)
CALCIUM SERPL-MCNC: 9.4 MG/DL (ref 8.5–10.5)
CALCIUM SERPL-MCNC: 9.5 MG/DL (ref 8.5–10.5)
CHLORIDE SERPL-SCNC: 106 MMOL/L (ref 96–112)
CHLORIDE SERPL-SCNC: 106 MMOL/L (ref 96–112)
CHOLEST SERPL-MCNC: 298 MG/DL (ref 100–199)
CO2 SERPL-SCNC: 25 MMOL/L (ref 20–33)
CO2 SERPL-SCNC: 25 MMOL/L (ref 20–33)
CREAT SERPL-MCNC: 0.94 MG/DL (ref 0.5–1.4)
CREAT SERPL-MCNC: 0.95 MG/DL (ref 0.5–1.4)
EOSINOPHIL # BLD AUTO: 0.27 K/UL (ref 0–0.51)
EOSINOPHIL # BLD AUTO: 0.28 K/UL (ref 0–0.51)
EOSINOPHIL NFR BLD: 5.1 % (ref 0–6.9)
EOSINOPHIL NFR BLD: 5.3 % (ref 0–6.9)
ERYTHROCYTE [DISTWIDTH] IN BLOOD BY AUTOMATED COUNT: 40.5 FL (ref 35.9–50)
ERYTHROCYTE [DISTWIDTH] IN BLOOD BY AUTOMATED COUNT: 40.7 FL (ref 35.9–50)
FERRITIN SERPL-MCNC: 145.5 NG/ML (ref 10–291)
GLOBULIN SER CALC-MCNC: 3.1 G/DL (ref 1.9–3.5)
GLOBULIN SER CALC-MCNC: 3.2 G/DL (ref 1.9–3.5)
GLUCOSE SERPL-MCNC: 101 MG/DL (ref 65–99)
GLUCOSE SERPL-MCNC: 106 MG/DL (ref 65–99)
HBV CORE AB SERPL QL IA: NEGATIVE
HBV CORE IGM SER QL: NEGATIVE
HBV SURFACE AB SERPL IA-ACNC: 3.36 MIU/ML (ref 0–10)
HBV SURFACE AB SERPL IA-ACNC: 6.2 MIU/ML (ref 0–10)
HBV SURFACE AG SER QL: NEGATIVE
HBV SURFACE AG SER QL: NEGATIVE
HCT VFR BLD AUTO: 42.2 % (ref 37–47)
HCT VFR BLD AUTO: 42.5 % (ref 37–47)
HCV AB SER QL: NEGATIVE
HCV AB SER QL: NEGATIVE
HDLC SERPL-MCNC: 42 MG/DL
HGB BLD-MCNC: 14.4 G/DL (ref 12–16)
HGB BLD-MCNC: 14.5 G/DL (ref 12–16)
IMM GRANULOCYTES # BLD AUTO: 0.01 K/UL (ref 0–0.11)
IMM GRANULOCYTES # BLD AUTO: 0.01 K/UL (ref 0–0.11)
IMM GRANULOCYTES NFR BLD AUTO: 0.2 % (ref 0–0.9)
IMM GRANULOCYTES NFR BLD AUTO: 0.2 % (ref 0–0.9)
INR PPP: 1.01 (ref 0.87–1.13)
IRON SATN MFR SERPL: 20 % (ref 15–55)
IRON SERPL-MCNC: 81 UG/DL (ref 40–170)
LDLC SERPL CALC-MCNC: 233 MG/DL
LYMPHOCYTES # BLD AUTO: 1.37 K/UL (ref 1–4.8)
LYMPHOCYTES # BLD AUTO: 1.43 K/UL (ref 1–4.8)
LYMPHOCYTES NFR BLD: 26 % (ref 22–41)
LYMPHOCYTES NFR BLD: 26.9 % (ref 22–41)
MCH RBC QN AUTO: 30.6 PG (ref 27–33)
MCH RBC QN AUTO: 31 PG (ref 27–33)
MCHC RBC AUTO-ENTMCNC: 33.9 G/DL (ref 33.6–35)
MCHC RBC AUTO-ENTMCNC: 34.4 G/DL (ref 33.6–35)
MCV RBC AUTO: 90.4 FL (ref 81.4–97.8)
MCV RBC AUTO: 90.4 FL (ref 81.4–97.8)
MONOCYTES # BLD AUTO: 0.46 K/UL (ref 0–0.85)
MONOCYTES # BLD AUTO: 0.48 K/UL (ref 0–0.85)
MONOCYTES NFR BLD AUTO: 8.7 % (ref 0–13.4)
MONOCYTES NFR BLD AUTO: 9 % (ref 0–13.4)
NEUTROPHILS # BLD AUTO: 3.09 K/UL (ref 2–7.15)
NEUTROPHILS # BLD AUTO: 3.1 K/UL (ref 2–7.15)
NEUTROPHILS NFR BLD: 58 % (ref 44–72)
NEUTROPHILS NFR BLD: 59 % (ref 44–72)
NRBC # BLD AUTO: 0 K/UL
NRBC # BLD AUTO: 0 K/UL
NRBC BLD-RTO: 0 /100 WBC
NRBC BLD-RTO: 0 /100 WBC
PLATELET # BLD AUTO: 304 K/UL (ref 164–446)
PLATELET # BLD AUTO: 313 K/UL (ref 164–446)
PMV BLD AUTO: 9.8 FL (ref 9–12.9)
PMV BLD AUTO: 9.9 FL (ref 9–12.9)
POTASSIUM SERPL-SCNC: 3.8 MMOL/L (ref 3.6–5.5)
POTASSIUM SERPL-SCNC: 3.9 MMOL/L (ref 3.6–5.5)
PROT SERPL-MCNC: 7.5 G/DL (ref 6–8.2)
PROT SERPL-MCNC: 7.6 G/DL (ref 6–8.2)
PROTHROMBIN TIME: 13 SEC (ref 12–14.6)
RBC # BLD AUTO: 4.67 M/UL (ref 4.2–5.4)
RBC # BLD AUTO: 4.7 M/UL (ref 4.2–5.4)
SODIUM SERPL-SCNC: 139 MMOL/L (ref 135–145)
SODIUM SERPL-SCNC: 140 MMOL/L (ref 135–145)
TIBC SERPL-MCNC: 398 UG/DL (ref 250–450)
TRIGL SERPL-MCNC: 113 MG/DL (ref 0–149)
TSH SERPL DL<=0.005 MIU/L-ACNC: 1.35 UIU/ML (ref 0.38–5.33)
WBC # BLD AUTO: 5.3 K/UL (ref 4.8–10.8)
WBC # BLD AUTO: 5.3 K/UL (ref 4.8–10.8)

## 2018-03-29 PROCEDURE — 82103 ALPHA-1-ANTITRYPSIN TOTAL: CPT

## 2018-03-29 PROCEDURE — 86803 HEPATITIS C AB TEST: CPT | Mod: 91

## 2018-03-29 PROCEDURE — 85025 COMPLETE CBC W/AUTO DIFF WBC: CPT | Mod: 91

## 2018-03-29 PROCEDURE — 83036 HEMOGLOBIN GLYCOSYLATED A1C: CPT

## 2018-03-29 PROCEDURE — 86225 DNA ANTIBODY NATIVE: CPT

## 2018-03-29 PROCEDURE — 82306 VITAMIN D 25 HYDROXY: CPT

## 2018-03-29 PROCEDURE — 80061 LIPID PANEL: CPT

## 2018-03-29 PROCEDURE — 86706 HEP B SURFACE ANTIBODY: CPT

## 2018-03-29 PROCEDURE — 83516 IMMUNOASSAY NONANTIBODY: CPT

## 2018-03-29 PROCEDURE — 86704 HEP B CORE ANTIBODY TOTAL: CPT

## 2018-03-29 PROCEDURE — 86038 ANTINUCLEAR ANTIBODIES: CPT

## 2018-03-29 PROCEDURE — 83540 ASSAY OF IRON: CPT

## 2018-03-29 PROCEDURE — 84443 ASSAY THYROID STIM HORMONE: CPT

## 2018-03-29 PROCEDURE — 85025 COMPLETE CBC W/AUTO DIFF WBC: CPT

## 2018-03-29 PROCEDURE — 84080 ASSAY ALKALINE PHOSPHATASES: CPT

## 2018-03-29 PROCEDURE — 82390 ASSAY OF CERULOPLASMIN: CPT | Mod: 91

## 2018-03-29 PROCEDURE — 80053 COMPREHEN METABOLIC PANEL: CPT | Mod: 91

## 2018-03-29 PROCEDURE — 84075 ASSAY ALKALINE PHOSPHATASE: CPT

## 2018-03-29 PROCEDURE — 86803 HEPATITIS C AB TEST: CPT

## 2018-03-29 PROCEDURE — 86708 HEPATITIS A ANTIBODY: CPT

## 2018-03-29 PROCEDURE — 86255 FLUORESCENT ANTIBODY SCREEN: CPT

## 2018-03-29 PROCEDURE — 82728 ASSAY OF FERRITIN: CPT

## 2018-03-29 PROCEDURE — 80053 COMPREHEN METABOLIC PANEL: CPT

## 2018-03-29 PROCEDURE — 86705 HEP B CORE ANTIBODY IGM: CPT

## 2018-03-29 PROCEDURE — 87340 HEPATITIS B SURFACE AG IA: CPT

## 2018-03-29 PROCEDURE — 86235 NUCLEAR ANTIGEN ANTIBODY: CPT | Mod: 91

## 2018-03-29 PROCEDURE — 85610 PROTHROMBIN TIME: CPT

## 2018-03-29 PROCEDURE — 83550 IRON BINDING TEST: CPT

## 2018-03-29 PROCEDURE — 86706 HEP B SURFACE ANTIBODY: CPT | Mod: 91

## 2018-03-29 PROCEDURE — 87340 HEPATITIS B SURFACE AG IA: CPT | Mod: 91

## 2018-03-29 PROCEDURE — 82390 ASSAY OF CERULOPLASMIN: CPT

## 2018-03-30 LAB
A1AT SERPL-MCNC: 112 MG/DL (ref 90–200)
CERULOPLASMIN SERPL-MCNC: 31 MG/DL (ref 17–54)
CERULOPLASMIN SERPL-MCNC: 31 MG/DL (ref 17–54)
EST. AVERAGE GLUCOSE BLD GHB EST-MCNC: 123 MG/DL
HAV AB SER QL IA: NEGATIVE
HBA1C MFR BLD: 5.9 % (ref 0–5.6)

## 2018-03-31 LAB
MITOCHONDRIA M2 IGG SER-ACNC: 13.3 UNITS (ref 0–20)
NUCLEAR IGG SER QL IA: NORMAL
SMA IGG SER-ACNC: 10 UNITS (ref 0–19)

## 2018-04-01 LAB
ALP BONE SERPL-CCNC: 29 U/L (ref 0–55)
ALP ISOS SERPL HS-CCNC: 0 U/L
ALP LIVER SERPL-CCNC: 65 U/L (ref 0–94)
ALP SERPL-CCNC: 94 U/L (ref 40–120)

## 2018-07-24 ENCOUNTER — OFFICE VISIT (OUTPATIENT)
Dept: MEDICAL GROUP | Facility: PHYSICIAN GROUP | Age: 63
End: 2018-07-24
Payer: COMMERCIAL

## 2018-07-24 VITALS
TEMPERATURE: 98.2 F | WEIGHT: 150 LBS | OXYGEN SATURATION: 95 % | SYSTOLIC BLOOD PRESSURE: 134 MMHG | HEIGHT: 63 IN | DIASTOLIC BLOOD PRESSURE: 84 MMHG | BODY MASS INDEX: 26.58 KG/M2 | RESPIRATION RATE: 16 BRPM | HEART RATE: 77 BPM

## 2018-07-24 DIAGNOSIS — R42 DIZZINESS: ICD-10-CM

## 2018-07-24 DIAGNOSIS — R10.32 LEFT INGUINAL PAIN: ICD-10-CM

## 2018-07-24 PROBLEM — B07.9 WART: Status: ACTIVE | Noted: 2018-07-24

## 2018-07-24 PROBLEM — R10.9 ABDOMINAL PAIN: Status: ACTIVE | Noted: 2018-07-24

## 2018-07-24 PROCEDURE — 99213 OFFICE O/P EST LOW 20 MIN: CPT | Performed by: INTERNAL MEDICINE

## 2018-07-24 NOTE — ASSESSMENT & PLAN NOTE
First went into urgent care 10/2017 for LLQ pain. She saw me for follow up 11/2017 and we treated her empirically with cipro/flagyl. Since then she had normal colonoscopy. Dr. Richardson's office has given her injections for her pain which helped a great deal with the pain but she's still having LLQ abdominal pain and diarrhea. Diarrhea somewhat improved since she eliminated starches (potatoes, bread, rice, sugar) as per gastroenterology's recommendation. LLQ pain is presently a burning pain. Siting in an awkward position will help. Cannot identify alleviating or exacerbating factors. She has had a history of hysterectomy a long while ago and has had adhesions from that.

## 2018-07-24 NOTE — ASSESSMENT & PLAN NOTE
First went into urgent care 10/2017 for LLQ pain. She saw me for follow up 11/2017 and we treated her empirically with cipro/flagyl. Since then she had normal colonoscopy. Dr. Richardson's office has given her injections for her pain which helped a great deal with the pain but she's still having LLQ abdominal pain and diarrhea. Diarrhea somewhat improved since she eliminated starches (potatoes, bread, rice, sugar) as per gastroenterology's recommendation. LLQ pain is presently a burning pain. Siting in an awkward position will help. Cannot identify alleviating or exacerbating factors. She has had a history of hysterectomy a long while ago and has had adhesions from that.     Presently she is pointing to her inguinal region and saying it is a pinpoint pain from her inguinal region straight through to her back. Said Dr. Richardson took hip x-rays and he wasn't sure if a finding on there could explain her symptoms. Pain isn't any worse with bearing weight on her left lower extremity or movement of that leg. Ibuprofen helps and tylenol doesn't help at all.

## 2018-07-24 NOTE — PROGRESS NOTES
PRIMARY CARE CLINIC FOLLOW UP VISIT  Chief Complaint   Patient presents with   • Side Pain     Since 10/2017      History of Present Illness     Dizziness  She has been having ongoing dizziness since October at the time she presented with LLQ pain and diarrhea. Dizziness described as room spinning. Getting up, turning her head, leaning over makes her dizziness worse. Leaning over makes it particularly worse. When she walks she has to hold onto her . Her symptoms are worse in the mornings and not constant. Associated with nausea but not vomiting. Symptoms improve in the afternoon and some morning she doesn't have symptoms at all.     Left inguinal pain  First went into urgent care 10/2017 for LLQ pain. She saw me for follow up 11/2017 and we treated her empirically with cipro/flagyl. Since then she had normal colonoscopy. Dr. Richardson's office has given her injections for her pain which helped a great deal with the pain but she's still having LLQ abdominal pain and diarrhea. Diarrhea somewhat improved since she eliminated starches (potatoes, bread, rice, sugar) as per gastroenterology's recommendation. LLQ pain is presently a burning pain. Siting in an awkward position will help. Cannot identify alleviating or exacerbating factors. She has had a history of hysterectomy a long while ago and has had adhesions from that.     Presently she is pointing to her inguinal region and saying it is a pinpoint pain from her inguinal region straight through to her back. Said Dr. Richardson took hip x-rays and he wasn't sure if a finding on there could explain her symptoms. Pain isn't any worse with bearing weight on her left lower extremity or movement of that leg. Ibuprofen helps and tylenol doesn't help at all.     Wart  Has a wart of her right hand that has been irritating her.     Current Outpatient Prescriptions   Medication Sig Dispense Refill   • conjugated estrogen (PREMARIN) 0.625 MG/GM Cream Use daily 1 Tube 2   •  Lifitegrast (XIIDRA) 5 % Solution Place 1 Drop in both eyes 2 Times a Day.     • ibuprofen (MOTRIN) 200 MG Tab Take 200 mg by mouth every 6 hours as needed.       No current facility-administered medications for this visit.      Past Medical History:   Diagnosis Date   • Abnormal mammogram 7/09    microcalcifications - atypical ductal hyperplasia   • Arthritis 5-2017    back, hands, neck   • Cataract    • Chronic low back pain    • Chronic neck pain    • Dyslipidemia    • Hepatic steatosis 11/15/2017   • Seborrheic keratoses, inflamed 12/13/2017     Past Surgical History:   Procedure Laterality Date   • TOE FUSION Right 5/15/2017    Procedure: TOE FUSION - 1ST AND 2ND TARSOMETATARSAL ;  Surgeon: Andrade Harirson M.D.;  Location: SURGERY [facility];  Service:    • LIGAMENT REPAIR  5/15/2017    Procedure: LIGAMENT REPAIR OPEN REDUCTION LIS FRANC LIGAMENT;  Surgeon: Andrade Harrison M.D.;  Location: SURGERY [facility];  Service:    • BREAST BIOPSY  7/8/2009    Performed by TATYANA MCKEE at SURGERY SAME DAY [facility]S   • CERVICAL DISK AND FUSION ANTERIOR  2/18/2009    Performed by RIVERA HERNANDEZ at SURGERY [facility]   • COLONOSCOPY  2004    hemorrhoids   • ABDOMINAL HYSTERECTOMY TOTAL      due to fibroids, tubes and ovaries not removed   • EYE SURGERY      RK both eyes   • OTHER ABDOMINAL SURGERY      laparoscopic surgery for adhesions   • RETINAL DETACHMENT REPAIR       Social History   Substance Use Topics   • Smoking status: Never Smoker   • Smokeless tobacco: Never Used   • Alcohol use 0.6 oz/week     1 Cans of beer per week     Social History     Social History Narrative    Retired       Family History   Problem Relation Age of Onset   • Hypertension Mother    • Hyperlipidemia Mother    • Other Mother      thyroid disease s/p surgery   • Cancer Father      throat; diagnosed late 50s   • Diabetes Sister    • Breast Cancer Sister    • Other Sister      glaucoma   • Other Sister       "horseshoe kidney      Family Status   Relation Status   • Mother Alive, age 88y    thyroid dis s/p thyroidectomy   • Father    • Sister    • Brother Alive   • Sister Alive   • Sister Alive     Allergies: Codeine; Food; and Latex    ROS  As per HPI above. All other systems reviewed and negative.        Objective   Blood pressure 134/84, pulse 77, temperature 36.8 °C (98.2 °F), resp. rate 16, height 1.6 m (5' 3\"), weight 68 kg (150 lb), SpO2 95 %. Body mass index is 26.57 kg/m².    General: alert and oriented, pleasant, cooperative  HEENT: Normocephalic, atraumatic.  Cardiovascular: regular rate and rhythm, normal S1/S2  Pulmonary: lungs clear to auscultation bilaterally  Gastrointestinal: no tenderness to palpation. No hepatosplenomegaly. Bowel sounds normoactive  Lymphatics: no cervical or supraclavicular lymphadenopathy   MSK: pain to palpation of left inguinal region   Skin: wart of dorsum of right hand   Psychiatric: appropriate mood and affect. Good insight and appropriate judgment     Assessment and Plan   The following treatment plan was discussed     1. Dizziness  Explained to her that her symptoms sound consistent with vertigo, referred for vestibular therapy.   - REFERRAL TO PHYSICAL THERAPY Reason for Therapy: Eval/Treat/Report    2. Left inguinal pain  Pain seems to be a burning of her left inguinal region, will upload plain films from Dr. Richardson's office and refer to physiatry. She has completed physical therapy (for her back, however) without relief.   - REFERRAL TO PHYSIATRY (PMR)      Healthcare maintenance     There are no preventive care reminders to display for this patient.    Return if symptoms worsen or fail to improve.    Gilberto Alexander MD  Internal Medicine  [facility]                   "

## 2018-07-24 NOTE — ASSESSMENT & PLAN NOTE
She has been having ongoing dizziness since October at the time she presented with LLQ pain and diarrhea. Dizziness described as room spinning. Getting up, turning her head, leaning over makes her dizziness worse. Leaning over makes it particularly worse. When she walks she has to hold onto her . Her symptoms are worse in the mornings and not constant. Associated with nausea but not vomiting. Symptoms improve in the afternoon and some morning she doesn't have symptoms at all.

## 2018-08-09 ENCOUNTER — HOSPITAL ENCOUNTER (OUTPATIENT)
Dept: RADIOLOGY | Facility: MEDICAL CENTER | Age: 63
End: 2018-08-09

## 2018-08-21 ENCOUNTER — PHYSICAL THERAPY (OUTPATIENT)
Dept: PHYSICAL THERAPY | Facility: REHABILITATION | Age: 63
End: 2018-08-21
Attending: INTERNAL MEDICINE
Payer: COMMERCIAL

## 2018-08-21 DIAGNOSIS — R26.89 IMBALANCE: ICD-10-CM

## 2018-08-21 DIAGNOSIS — R42 DIZZINESS AND GIDDINESS: ICD-10-CM

## 2018-08-21 PROCEDURE — 97161 PT EVAL LOW COMPLEX 20 MIN: CPT

## 2018-08-21 PROCEDURE — 97535 SELF CARE MNGMENT TRAINING: CPT

## 2018-08-21 ASSESSMENT — ENCOUNTER SYMPTOMS
PAIN SCALE AT HIGHEST: 10
PAIN SCALE AT LOWEST: 0
PAIN SCALE: 0

## 2018-08-21 NOTE — OP THERAPY EVALUATION
Outpatient Physical Therapy  VESTIBULAR EVALUATION    [facility] [address]  [address]  [facility] [address]  Phone:  [phone]  Fax:  [phone]    Date of Evaluation: 2018    Patient: María Colindres  YOB: 1955  MRN: [id number]     Referring Provider: Gilberto Alexander M.D.  [address] [place] [id number]   Referring Diagnosis: Dizziness [R42]     Time Calculation  Start time: 1329  Stop time: 1427 Time Calculation (min): 58 minutes     Physical Therapy Occurrence Codes    Date physical therapy care plan established or reviewed:  18   Date physical therapy treatment started:  18          Chief Complaint: Vertigo    Visit Diagnoses     ICD-10-CM   1. Imbalance R26.89   2. Dizziness and giddiness R42         History of Present Illness:     Mechanism of injury:  Pt presents to PT with complaint of intermittent dizziness.  The first episode came on approx 4 years ago.  States she awoke feeling off and strange.  This worsened as the day progressed and turned into spinning vertigo that lasted a couple days.  Nausea but no vomiting.  Went to a clinic and given meds, but didn't help.  Eventually the symptoms resolved on their own within 1-2 weeks.  No preceding illness.  Has experienced episodic vertigo since.  Comes in waves, but can be as often as 3/month and avg 3 days.  Tries to just stay calm and rest during the episodes.  No episodes in the last 3 months. BW episodes is always feeling a little off.  Pressure in B ears, foggy in the head, unsteady.      Also has on going lumbar and neck issues with c/s fusion and recent PT.  Undergoing c/s injections now.      Prior level of function:  Retired, hobbies: crafting and computer work.     Headaches: tension headaches      Ear problems:  None    Sleep disturbance:  Interrupted sleep (only when severe)  Symptoms:     Current symptom ratin    At best symptom ratin    At worst symptom  rating:  10 (only during severe episodes.)    Progression:  Stable  Social Support:     Lives in:  One-story house    Lives with:  Spouse  Diagnostic Tests:     Diagnostic tests comments:  Has never been to an ENT.   Treatments:     No prior treatments received    Patient goals:     Patient goals for therapy:  Improved balance      Past Medical History:   Diagnosis Date   • Abnormal mammogram 7/09    microcalcifications - atypical ductal hyperplasia   • Arthritis 5-2017    back, hands, neck   • Cataract    • Chronic low back pain    • Chronic neck pain    • Dyslipidemia    • Hepatic steatosis 11/15/2017   • Seborrheic keratoses, inflamed 12/13/2017     Past Surgical History:   Procedure Laterality Date   • TOE FUSION Right 5/15/2017    Procedure: TOE FUSION - 1ST AND 2ND TARSOMETATARSAL ;  Surgeon: Andrade Harrison M.D.;  Location: SURGERY [facility];  Service:    • LIGAMENT REPAIR  5/15/2017    Procedure: LIGAMENT REPAIR OPEN REDUCTION LIS FRANC LIGAMENT;  Surgeon: Andrade Harrison M.D.;  Location: SURGERY [facility];  Service:    • BREAST BIOPSY  7/8/2009    Performed by TATYANA MCKEE at SURGERY SAME DAY [facility]S   • CERVICAL DISK AND FUSION ANTERIOR  2/18/2009    Performed by RIVERA HERNANDEZ at SURGERY [facility]   • COLONOSCOPY  2004    hemorrhoids   • ABDOMINAL HYSTERECTOMY TOTAL      due to fibroids, tubes and ovaries not removed   • EYE SURGERY      RK both eyes   • OTHER ABDOMINAL SURGERY      laparoscopic surgery for adhesions   • RETINAL DETACHMENT REPAIR       Social History   Substance Use Topics   • Smoking status: Never Smoker   • Smokeless tobacco: Never Used   • Alcohol use 0.6 oz/week     1 Cans of beer per week     Family and Occupational History     Social History   • Marital status:      Spouse name: N/A   • Number of children: N/A   • Years of education: N/A       Objective:    Gait:     Comments: WNL  Vestibulospinal Exam:     MCTSIB:         Firm, eyes open: within  normal limits        Firm, eyes closed: 2% below normal        Foam, eyes open: 17% below normal        Foam, eyes closed: within normal limits        Composite Score: within normal limits      Oculomotor Exam:         Details: No spontaneous nystagmus central gaze          Details: No spontaneous nystagmus eccentric gaze    No saccadic eye movements    Smooth pursuit present    Convergence:        Normal convergence    No skew  Active Range of Motion:     Within functional limits  Limb Ataxia Exam:     Finger-to-Nose:         Intention tremor: none    Dysdiadochokinesia: none.  Strength Exam:     Upper extremities within functional limits    Lower extremities within functional limits  Reflex Exam:       Deep Tendon Reflexes:         Left L4 (Patellar): normal (2+)        Right L4 (Patellar): normal (2+)  Sensation Tests:     Left Light Touch Sensation:         All left lumbar dermatomes intact      Right Light Touch Sensation:         All right lumbar dermatomes intact      Vestibulo-Ocular Exam:     Abnormal head thrust test        Details: corrective saccade on head moving left  BPPV Exam:     Normal Yuma-Hallpike    Normal straight head-hanging test    Normal roll test        Therapeutic Treatments and Modalities:     1. Functional Training, Self Care (CPT 25899), Education: vestibular pathoanat, peripheral hypofunction (signs/symptoms), goals and POC with VRT.  HEP: given full packet with first 5 highlighted to focus on.  Able to return demo indep    Time-based treatments/modalities:  Functional training, self care minutes (CPT 18597): 20 minutes         Assessment:     Functional impairments:  Decreased utilization of VIS/vest/somato and decreased postural stability    Assessment details:  Mrs. Colindres is a 62 y.o female who presents to PT with complaint of intermittent dizziness and imbalance since an intense vertigo episode 4 yrs ago.  PT evaluation is consistent with L peripheral hypofunction.  She has not had  an ENG to confirm, but does show positive L LIN test and G1 nystagmus with L gaze in frenzels.  Her condition appears to be well accommodated at this time, with little decline in balance under vestibular conditions.  Pt would benefit from skilled PT services for VRT and to improve QOL.  Since symptom severity is low at this time, she would like to focus on HEP development.     Barriers to therapy:  None    Prognosis: good    Goals:     Short term goals:  - Indep with VRT HEP packet    Short term goal time span:  1-2 weeks    Long term goals:  - Improve MCSTIB to WNL across all measures  - Decrease DHI at least 15%    Long term goal time span:  6-8 weeks  Plan:     Therapy options:  Physical therapy treatment to continue    Planned therapy interventions:  Functional Training, Self Care (CPT 38184), Neuromuscular Re-education (CPT 45634), Therapeutic Exercise (CPT 62424) and Therapeutic Activities (CPT 18782)    Frequency:  1x week    Duration in weeks:  6    Discussed with:  Patient      Functional Limitation G-Codes and Severity Modifiers  PT Functional Assessment Tool Used: DHI  PT Functional Assessment Score: 42     Referring provider co-signature:  I have reviewed this plan of care and my co-signature certifies the need for services.  Certification Dates:   From 8/21/18     To 10/3/18    Physician Signature: ________________________________ Date: ______________

## 2018-08-23 ENCOUNTER — APPOINTMENT (OUTPATIENT)
Dept: PHYSICAL THERAPY | Facility: REHABILITATION | Age: 63
End: 2018-08-23
Attending: INTERNAL MEDICINE
Payer: COMMERCIAL

## 2018-08-28 ENCOUNTER — APPOINTMENT (OUTPATIENT)
Dept: PHYSICAL THERAPY | Facility: REHABILITATION | Age: 63
End: 2018-08-28
Attending: INTERNAL MEDICINE
Payer: COMMERCIAL

## 2018-08-30 ENCOUNTER — APPOINTMENT (OUTPATIENT)
Dept: PHYSICAL THERAPY | Facility: REHABILITATION | Age: 63
End: 2018-08-30
Attending: INTERNAL MEDICINE
Payer: COMMERCIAL

## 2018-11-05 ENCOUNTER — HOSPITAL ENCOUNTER (OUTPATIENT)
Dept: CARDIOLOGY | Facility: MEDICAL CENTER | Age: 63
End: 2018-11-05
Attending: INTERNAL MEDICINE
Payer: COMMERCIAL

## 2018-11-05 ENCOUNTER — HOSPITAL ENCOUNTER (OUTPATIENT)
Dept: LAB | Facility: MEDICAL CENTER | Age: 63
End: 2018-11-05
Attending: NEUROLOGICAL SURGERY
Payer: COMMERCIAL

## 2018-11-05 ENCOUNTER — HOSPITAL ENCOUNTER (OUTPATIENT)
Dept: RADIOLOGY | Facility: MEDICAL CENTER | Age: 63
End: 2018-11-05
Attending: NEUROLOGICAL SURGERY
Payer: COMMERCIAL

## 2018-11-05 ENCOUNTER — TELEPHONE (OUTPATIENT)
Dept: PHYSICAL THERAPY | Facility: REHABILITATION | Age: 63
End: 2018-11-05

## 2018-11-05 DIAGNOSIS — M48.02 SPINAL STENOSIS IN CERVICAL REGION: ICD-10-CM

## 2018-11-05 DIAGNOSIS — M50.30 DEGENERATION OF CERVICAL INTERVERTEBRAL DISC: ICD-10-CM

## 2018-11-05 LAB
ANION GAP SERPL CALC-SCNC: 7 MMOL/L (ref 0–11.9)
APPEARANCE UR: CLEAR
APTT PPP: 25.9 SEC (ref 24.7–36)
BACTERIA #/AREA URNS HPF: ABNORMAL /HPF
BASOPHILS # BLD AUTO: 0.7 % (ref 0–1.8)
BASOPHILS # BLD: 0.05 K/UL (ref 0–0.12)
BILIRUB UR QL STRIP.AUTO: NEGATIVE
BUN SERPL-MCNC: 18 MG/DL (ref 8–22)
CALCIUM SERPL-MCNC: 9.7 MG/DL (ref 8.5–10.5)
CHLORIDE SERPL-SCNC: 105 MMOL/L (ref 96–112)
CO2 SERPL-SCNC: 26 MMOL/L (ref 20–33)
COLOR UR: YELLOW
CREAT SERPL-MCNC: 0.84 MG/DL (ref 0.5–1.4)
EOSINOPHIL # BLD AUTO: 0.26 K/UL (ref 0–0.51)
EOSINOPHIL NFR BLD: 3.6 % (ref 0–6.9)
EPI CELLS #/AREA URNS HPF: ABNORMAL /HPF
ERYTHROCYTE [DISTWIDTH] IN BLOOD BY AUTOMATED COUNT: 42.6 FL (ref 35.9–50)
GLUCOSE SERPL-MCNC: 92 MG/DL (ref 65–99)
GLUCOSE UR STRIP.AUTO-MCNC: NEGATIVE MG/DL
HCT VFR BLD AUTO: 43.6 % (ref 37–47)
HGB BLD-MCNC: 14.3 G/DL (ref 12–16)
HYALINE CASTS #/AREA URNS LPF: ABNORMAL /LPF
IMM GRANULOCYTES # BLD AUTO: 0.02 K/UL (ref 0–0.11)
IMM GRANULOCYTES NFR BLD AUTO: 0.3 % (ref 0–0.9)
INR PPP: 0.94 (ref 0.87–1.13)
KETONES UR STRIP.AUTO-MCNC: NEGATIVE MG/DL
LEUKOCYTE ESTERASE UR QL STRIP.AUTO: ABNORMAL
LYMPHOCYTES # BLD AUTO: 2.91 K/UL (ref 1–4.8)
LYMPHOCYTES NFR BLD: 40.4 % (ref 22–41)
MCH RBC QN AUTO: 30.4 PG (ref 27–33)
MCHC RBC AUTO-ENTMCNC: 32.8 G/DL (ref 33.6–35)
MCV RBC AUTO: 92.6 FL (ref 81.4–97.8)
MICRO URNS: ABNORMAL
MONOCYTES # BLD AUTO: 0.69 K/UL (ref 0–0.85)
MONOCYTES NFR BLD AUTO: 9.6 % (ref 0–13.4)
NEUTROPHILS # BLD AUTO: 3.28 K/UL (ref 2–7.15)
NEUTROPHILS NFR BLD: 45.4 % (ref 44–72)
NITRITE UR QL STRIP.AUTO: NEGATIVE
NRBC # BLD AUTO: 0 K/UL
NRBC BLD-RTO: 0 /100 WBC
PH UR STRIP.AUTO: 6 [PH]
PLATELET # BLD AUTO: 314 K/UL (ref 164–446)
PMV BLD AUTO: 9 FL (ref 9–12.9)
POTASSIUM SERPL-SCNC: 4 MMOL/L (ref 3.6–5.5)
PROT UR QL STRIP: NEGATIVE MG/DL
PROTHROMBIN TIME: 12.7 SEC (ref 12–14.6)
RBC # BLD AUTO: 4.71 M/UL (ref 4.2–5.4)
RBC # URNS HPF: ABNORMAL /HPF
RBC UR QL AUTO: NEGATIVE
SODIUM SERPL-SCNC: 138 MMOL/L (ref 135–145)
SP GR UR STRIP.AUTO: 1.02
UROBILINOGEN UR STRIP.AUTO-MCNC: 0.2 MG/DL
WBC # BLD AUTO: 7.2 K/UL (ref 4.8–10.8)
WBC #/AREA URNS HPF: ABNORMAL /HPF

## 2018-11-05 PROCEDURE — 71046 X-RAY EXAM CHEST 2 VIEWS: CPT

## 2018-11-05 PROCEDURE — 93005 ELECTROCARDIOGRAM TRACING: CPT | Performed by: INTERNAL MEDICINE

## 2018-11-05 PROCEDURE — 93010 ELECTROCARDIOGRAM REPORT: CPT | Performed by: INTERNAL MEDICINE

## 2018-11-05 PROCEDURE — 85730 THROMBOPLASTIN TIME PARTIAL: CPT

## 2018-11-05 PROCEDURE — 72050 X-RAY EXAM NECK SPINE 4/5VWS: CPT

## 2018-11-05 PROCEDURE — 80048 BASIC METABOLIC PNL TOTAL CA: CPT

## 2018-11-05 PROCEDURE — 81001 URINALYSIS AUTO W/SCOPE: CPT

## 2018-11-05 PROCEDURE — 85025 COMPLETE CBC W/AUTO DIFF WBC: CPT

## 2018-11-05 PROCEDURE — 36415 COLL VENOUS BLD VENIPUNCTURE: CPT

## 2018-11-05 PROCEDURE — 85610 PROTHROMBIN TIME: CPT

## 2018-11-05 NOTE — OP THERAPY DISCHARGE SUMMARY
Outpatient Physical Therapy  DISCHARGE SUMMARY NOTE      [facility] Physical Therapy [address]  [address]  [place] [address]  Phone:  [phone]  Fax:  [phone]    Date of Visit: 11/05/2018    Patient: María Colindres  YOB: 1955  MRN: [id number]     Referring Provider: Gilberto Alexander M.D.  [address] [place] [id number]   Referring Diagnosis Dizziness [R42]     Physical Therapy Occurrence Codes    Date physical therapy care plan established or reviewed:  8/21/18   Date physical therapy treatment started:  8/21/18        Your patient is being discharged from Physical Therapy with the following comments:   · Patient has failed to schedule or reschedule follow-up visits    Comments:  Ms. Colindres was seen only for 1 PT session evaluating her dizziness/imbalance.  PT evaluation was consistent with peripheral hypofunction and VRT was recommended 1x/week.  Pt did not follow up beyond eval.       Recommendations:  D/C due to lapse in care.  Will need a new Rx if return is indicated.     Abby Fountain, PT, DPT    Date: 11/5/2018

## 2018-11-06 LAB — EKG IMPRESSION: NORMAL

## 2021-03-03 DIAGNOSIS — Z23 NEED FOR VACCINATION: ICD-10-CM

## 2021-04-14 ENCOUNTER — HOSPITAL ENCOUNTER (OUTPATIENT)
Dept: RADIOLOGY | Facility: MEDICAL CENTER | Age: 66
End: 2021-04-14
Attending: NURSE PRACTITIONER
Payer: MEDICARE

## 2021-04-14 DIAGNOSIS — M79.671 RIGHT FOOT PAIN: ICD-10-CM

## 2021-04-14 DIAGNOSIS — M25.374 OTHER INSTABILITY, RIGHT FOOT: ICD-10-CM

## 2021-04-14 PROCEDURE — 73718 MRI LOWER EXTREMITY W/O DYE: CPT | Mod: RT,MH

## 2023-11-28 ENCOUNTER — APPOINTMENT (OUTPATIENT)
Dept: RADIOLOGY | Facility: IMAGING CENTER | Age: 68
End: 2023-11-28
Attending: NURSE PRACTITIONER
Payer: MEDICARE

## 2023-11-28 ENCOUNTER — OFFICE VISIT (OUTPATIENT)
Dept: URGENT CARE | Facility: PHYSICIAN GROUP | Age: 68
End: 2023-11-28
Payer: MEDICARE

## 2023-11-28 VITALS
SYSTOLIC BLOOD PRESSURE: 118 MMHG | OXYGEN SATURATION: 94 % | HEART RATE: 85 BPM | HEIGHT: 64 IN | RESPIRATION RATE: 16 BRPM | TEMPERATURE: 97.8 F | DIASTOLIC BLOOD PRESSURE: 70 MMHG | WEIGHT: 147 LBS | BODY MASS INDEX: 25.1 KG/M2

## 2023-11-28 DIAGNOSIS — M16.12 PRIMARY OSTEOARTHRITIS OF LEFT HIP: ICD-10-CM

## 2023-11-28 DIAGNOSIS — M25.552 ACUTE HIP PAIN, LEFT: ICD-10-CM

## 2023-11-28 PROCEDURE — 3078F DIAST BP <80 MM HG: CPT | Performed by: NURSE PRACTITIONER

## 2023-11-28 PROCEDURE — 73502 X-RAY EXAM HIP UNI 2-3 VIEWS: CPT | Mod: TC,LT | Performed by: NURSE PRACTITIONER

## 2023-11-28 PROCEDURE — 3074F SYST BP LT 130 MM HG: CPT | Performed by: NURSE PRACTITIONER

## 2023-11-28 PROCEDURE — 99202 OFFICE O/P NEW SF 15 MIN: CPT | Performed by: NURSE PRACTITIONER

## 2023-11-28 PROCEDURE — 1170F FXNL STATUS ASSESSED: CPT | Performed by: NURSE PRACTITIONER

## 2023-11-28 RX ORDER — ATORVASTATIN CALCIUM 40 MG/1
TABLET, FILM COATED ORAL
COMMUNITY
End: 2023-11-28

## 2023-11-28 ASSESSMENT — ENCOUNTER SYMPTOMS
SENSORY CHANGE: 0
MYALGIAS: 0
FEVER: 0
FOCAL WEAKNESS: 0
TINGLING: 0
CHILLS: 0

## 2023-11-28 NOTE — PROGRESS NOTES
Subjective     María Colindres is a 68 y.o. female who presents with Hip Pain (left side,x1 week)            HPI  New problem.  Patient is a very pleasant 68-year-old female with a history of breast cancer who presents with left-sided hip pain x 1 week.  She reports the pain is in the front radiating around to the back.  She denies any trauma to this area.  She has been treating this with ibuprofen with minimal relief of symptoms.  Her breast cancer diagnosis was approximately 2 years ago and she is not taking a hormone blocker for this.    Codeine, Food, and Latex  Current Outpatient Medications on File Prior to Visit   Medication Sig Dispense Refill    ibuprofen (MOTRIN) 200 MG Tab Take 200 mg by mouth every 6 hours as needed.       No current facility-administered medications on file prior to visit.     Social History     Socioeconomic History    Marital status:      Spouse name: Not on file    Number of children: Not on file    Years of education: Not on file    Highest education level: Not on file   Occupational History    Not on file   Tobacco Use    Smoking status: Never    Smokeless tobacco: Never   Substance and Sexual Activity    Alcohol use: Yes     Alcohol/week: 0.6 oz     Types: 1 Cans of beer per week    Drug use: No    Sexual activity: Yes     Partners: Male     Comment:     Other Topics Concern    Not on file   Social History Narrative    Retired       Social Determinants of Health     Financial Resource Strain: Not on file   Food Insecurity: Not on file   Transportation Needs: Not on file   Physical Activity: Not on file   Stress: Not on file   Social Connections: Not on file   Intimate Partner Violence: Not on file   Housing Stability: Not on file     Breast Cancer-related family history is not on file.      Review of Systems   Constitutional:  Negative for chills, fever and malaise/fatigue.   Musculoskeletal:  Positive for joint pain. Negative for myalgias.   Skin:   "Negative for itching and rash.   Neurological:  Negative for tingling, sensory change and focal weakness.   All other systems reviewed and are negative.             Objective     /70 (BP Location: Right arm, Patient Position: Sitting, BP Cuff Size: Adult)   Pulse 85   Temp 36.6 °C (97.8 °F) (Temporal)   Resp 16   Ht 1.626 m (5' 4\")   Wt 66.7 kg (147 lb)   SpO2 94%   BMI 25.23 kg/m²      Physical Exam  Vitals and nursing note reviewed.   Constitutional:       Appearance: Normal appearance. She is not ill-appearing.   Cardiovascular:      Rate and Rhythm: Normal rate. Rhythm irregular.      Heart sounds: No murmur heard.  Pulmonary:      Effort: Pulmonary effort is normal.      Breath sounds: Normal breath sounds.   Musculoskeletal:      Left hip: Tenderness and bony tenderness present. No deformity. Normal range of motion.   Skin:     General: Skin is warm and dry.   Neurological:      General: No focal deficit present.      Mental Status: She is alert and oriented to person, place, and time.   Psychiatric:         Mood and Affect: Mood normal.                             Assessment & Plan        1. Primary osteoarthritis of left hip        2. Acute hip pain, left  DX-HIP-COMPLETE - UNILATERAL 2+ LEFT        Mild/moderate arthritis of left hip.  Advised that she may take otc tylenol arthritis with the ibuprofen.  Offered referral to ortho but she would like to wait for now.  Differential diagnosis, natural history, supportive care, and indications for immediate follow-up were discussed.                "

## 2024-05-02 ENCOUNTER — OFFICE VISIT (OUTPATIENT)
Dept: URGENT CARE | Facility: PHYSICIAN GROUP | Age: 69
End: 2024-05-02
Payer: MEDICARE

## 2024-05-02 VITALS
HEIGHT: 64 IN | RESPIRATION RATE: 14 BRPM | HEART RATE: 84 BPM | BODY MASS INDEX: 25.1 KG/M2 | OXYGEN SATURATION: 97 % | WEIGHT: 147 LBS | TEMPERATURE: 98.8 F | DIASTOLIC BLOOD PRESSURE: 74 MMHG | SYSTOLIC BLOOD PRESSURE: 126 MMHG

## 2024-05-02 DIAGNOSIS — N64.4 BREAST PAIN: ICD-10-CM

## 2024-05-02 DIAGNOSIS — Z85.3 HISTORY OF BREAST CANCER IN FEMALE: ICD-10-CM

## 2024-05-02 PROCEDURE — 3078F DIAST BP <80 MM HG: CPT | Performed by: PHYSICIAN ASSISTANT

## 2024-05-02 PROCEDURE — 1125F AMNT PAIN NOTED PAIN PRSNT: CPT | Performed by: PHYSICIAN ASSISTANT

## 2024-05-02 PROCEDURE — 3074F SYST BP LT 130 MM HG: CPT | Performed by: PHYSICIAN ASSISTANT

## 2024-05-02 PROCEDURE — 99215 OFFICE O/P EST HI 40 MIN: CPT | Performed by: PHYSICIAN ASSISTANT

## 2024-05-02 ASSESSMENT — ENCOUNTER SYMPTOMS
RESPIRATORY NEGATIVE: 1
FEVER: 0
WEIGHT LOSS: 0
CHILLS: 1
CARDIOVASCULAR NEGATIVE: 1
DIAPHORESIS: 1

## 2024-05-02 ASSESSMENT — PAIN SCALES - GENERAL: PAINLEVEL: 5=MODERATE PAIN

## 2024-05-02 NOTE — PROGRESS NOTES
"  Subjective:     María Colindres  is a 68 y.o. female who presents for Breast Pain (Right  breast pain x 4 days /Pt had breast operation in 2021 Cancer right breast/And one on left breast in 2017/)       She presents today for right-sided breast pain has been ongoing the last 4 days.  Associated chills and sweats, no fevers, no unexplained weight gain or weight loss.  Patient does have a history of bilateral breast cancer, states that a left-sided precancerous tissue was removed, did have 3 surgeries on the right breast due to cancerous tissue.  She did not have full mastectomy.  Does note lymph node resection on the right side.  She describes the pain as being over the lower breast and over the lateral portion of the breast extending into the axilla.  She does have scar tissue present is sensitive to palpation but her new symptoms within the last 4 days do feel different.  She denies any palpable masses.  No breast lactation.       Review of Systems   Constitutional:  Positive for chills and diaphoresis. Negative for fever, malaise/fatigue and weight loss.   Respiratory: Negative.     Cardiovascular: Negative.    Skin:         Right-sided breast pain      Allergies   Allergen Reactions    Codeine      nausea    Food      Wine: severe headache/migraine    Latex      From previous surgery; possible adhesions     Past Medical History:   Diagnosis Date    Abnormal mammogram 7/09    microcalcifications - atypical ductal hyperplasia    Arthritis 5-2017    back, hands, neck    Cataract     Chronic low back pain     Chronic neck pain     Dyslipidemia     Hepatic steatosis 11/15/2017    Seborrheic keratoses, inflamed 12/13/2017        Objective:   /74 (BP Location: Left arm, Patient Position: Sitting, BP Cuff Size: Adult)   Pulse 84   Temp 37.1 °C (98.8 °F) (Temporal)   Resp 14   Ht 1.626 m (5' 4\")   Wt 66.7 kg (147 lb) Comment: pt reported  SpO2 97%   BMI 25.23 kg/m²   Physical Exam  Vitals and nursing note " reviewed. Exam conducted with a chaperone present.   Constitutional:       General: She is not in acute distress.     Appearance: She is not ill-appearing or toxic-appearing.   HENT:      Head: Normocephalic.      Nose: No rhinorrhea.   Eyes:      General: No scleral icterus.     Conjunctiva/sclera: Conjunctivae normal.   Pulmonary:      Effort: Pulmonary effort is normal. No respiratory distress.      Breath sounds: No stridor.   Musculoskeletal:      Cervical back: Neck supple.   Skin:     Comments: Breast examination on the right side does reveal tenderness palpation over all portions of the lower breast, tenderness to palpation in the lateral breast and in the breast tissue extending into the axillary region.  Multiple mobile rubbery masses were felt in the breast but no firm, nonmobile masses palpable.  No erythema of the breast tissue.  There is scarring from previous surgery from the 9-12 o'clock position She is 0.5-1 cm from the areola.   Neurological:      Mental Status: She is alert and oriented to person, place, and time.   Psychiatric:         Mood and Affect: Mood normal.         Behavior: Behavior normal.         Thought Content: Thought content normal.         Judgment: Judgment normal.             Diagnostic testing:    Stat diagnostic bilateral mammogram-scheduled for 5/3/2024    Stat bilateral breast tissue ultrasound-scheduled for 5/3/2024    Assessment/Plan:     Encounter Diagnoses   Name Primary?    Breast pain     History of breast cancer in female           Plan for care for today's complaint includes obtaining stat diagnostic mammogram and bilateral breast tissue ultrasound for evaluation of the right-sided breast pain.  Will contact patient via phone call to discuss result.  Patient does have a history of breast cancer left breast in 2017, did undergo surgery.  Breast cancer present in 2021 over the right breast, did have multiple surgeries at that time.  She is also experiencing chills and  sweats.  She did recently relocate from [facility], did have an oncology provider in [place] but is requesting reestablishment in the [facility].  If symptoms worsen I did instruct the patient to follow-up immediately in the emergency department..    See AVS Instructions below for written guidance provided to patient on after-visit management and care in addition to our verbal discussion during the visit.    Please note that this dictation was created using voice recognition software. I have attempted to correct all errors, but there may be sound-alike, spelling, grammar and possibly content errors that I did not discover before finalizing the note.    Lihueharis Lu PA-C

## 2024-05-03 ENCOUNTER — APPOINTMENT (OUTPATIENT)
Dept: RADIOLOGY | Facility: MEDICAL CENTER | Age: 69
End: 2024-05-03
Attending: PHYSICIAN ASSISTANT
Payer: MEDICARE

## 2024-05-06 ENCOUNTER — HOSPITAL ENCOUNTER (OUTPATIENT)
Dept: RADIOLOGY | Facility: MEDICAL CENTER | Age: 69
End: 2024-05-06
Payer: MEDICARE

## 2024-05-13 ENCOUNTER — HOSPITAL ENCOUNTER (OUTPATIENT)
Dept: RADIOLOGY | Facility: MEDICAL CENTER | Age: 69
End: 2024-05-13
Attending: PHYSICIAN ASSISTANT
Payer: MEDICARE

## 2024-05-13 DIAGNOSIS — N64.4 BREAST PAIN: ICD-10-CM

## 2024-05-13 DIAGNOSIS — Z85.3 HISTORY OF BREAST CANCER IN FEMALE: ICD-10-CM

## 2024-05-13 NOTE — PROGRESS NOTES
"Previous referral from 5/2/2024 to intake oncology coordinator still remained in the \"new request\" status.  We had obtained mammogram and ultrasound for patient's breast pain, these were negative.  Did place new referral to breast cancer clinic for routine management as patient does have a history of breast cancer with previous surgery    Prestonharis Lu PA-C  "

## 2024-05-17 ENCOUNTER — TELEPHONE (OUTPATIENT)
Dept: SURGERY | Facility: MEDICAL CENTER | Age: 69
End: 2024-05-17
Payer: MEDICARE

## 2024-05-17 NOTE — TELEPHONE ENCOUNTER
Spoke with María about scheduling a consultation appt within our office with one our breast surgeons. Pt declined an appt at this time and would like to just keep her appt with [facility] for now.     KJ

## 2024-06-25 ENCOUNTER — HOSPITAL ENCOUNTER (OUTPATIENT)
Dept: HEMATOLOGY ONCOLOGY | Facility: MEDICAL CENTER | Age: 69
End: 2024-06-25
Attending: INTERNAL MEDICINE
Payer: MEDICARE

## 2024-06-25 VITALS
SYSTOLIC BLOOD PRESSURE: 130 MMHG | HEART RATE: 85 BPM | DIASTOLIC BLOOD PRESSURE: 74 MMHG | TEMPERATURE: 97.7 F | BODY MASS INDEX: 26.05 KG/M2 | HEIGHT: 64 IN | WEIGHT: 152.56 LBS | OXYGEN SATURATION: 97 %

## 2024-06-25 DIAGNOSIS — Z85.3 HISTORY OF RIGHT BREAST CANCER: ICD-10-CM

## 2024-06-25 DIAGNOSIS — Z85.3 HISTORY OF LEFT BREAST CANCER: ICD-10-CM

## 2024-06-25 PROCEDURE — 99212 OFFICE O/P EST SF 10 MIN: CPT | Performed by: INTERNAL MEDICINE

## 2024-06-25 RX ORDER — ACETAMINOPHEN 500 MG
500-1000 TABLET ORAL EVERY 6 HOURS PRN
COMMUNITY

## 2024-06-25 RX ORDER — OMEGA-3 FATTY ACIDS/FISH OIL 300-1000MG
1 CAPSULE ORAL
COMMUNITY

## 2024-06-25 ASSESSMENT — PAIN SCALES - GENERAL: PAINLEVEL: NO PAIN

## 2024-06-25 NOTE — PROGRESS NOTES
Consult:  Hematology/Oncology      Referring Physician: Dagoberto Lu PA-C  Primary Care:  Pcp Pt States None    Diagnosis: Bilateral metachronous breast cancer on observation    Chief Complaint: Bilateral metachronous breast cancer on observation    History of Presenting Illness:  María Colindres is a 68 y.o. female who had a left breast cancer in 2017 with Dr. Martin treated with partial mastectomy and then had a right breast cancer in 2021 treated with partial mastectomy and lymph node dissection in [facility].  Margins were involved and she required 3 surgeries to clear the margins.  The details of the pathology are not available to me today.  She did not have any radiation, chemotherapy or endocrine therapy subsequently.  She has had persistent pain in the right breast.  Her recent diagnostic mammogram and ultrasound showed scar tissue only.  She has moved back to [place] and wishes to establish oncologic care here.  Health is good for age.  She has no symptoms referable to local or metastatic recurrence.      Past Medical History:   Diagnosis Date    Abnormal mammogram 7/09    microcalcifications - atypical ductal hyperplasia    Arthritis 5-2017    back, hands, neck    Cataract     Chronic low back pain     Chronic neck pain     Dyslipidemia     Hepatic steatosis 11/15/2017    Seborrheic keratoses, inflamed 12/13/2017       Past Surgical History:   Procedure Laterality Date    TOE FUSION Right 5/15/2017    Procedure: TOE FUSION - 1ST AND 2ND TARSOMETATARSAL ;  Surgeon: Andrade Harrison M.D.;  Location: SURGERY [facility];  Service:     LIGAMENT REPAIR  5/15/2017    Procedure: LIGAMENT REPAIR OPEN REDUCTION LIS FRANC LIGAMENT;  Surgeon: Andrade Harrison M.D.;  Location: SURGERY [facility];  Service:     BREAST BIOPSY  7/8/2009    Performed by TATYANA MCKEE at SURGERY SAME DAY [facility]    CERVICAL DISK AND FUSION ANTERIOR  2/18/2009    Performed by RIVERA HERNANDEZ at SURGERY [facility]     "COLONOSCOPY  2004    hemorrhoids    ABDOMINAL HYSTERECTOMY TOTAL      due to fibroids, tubes and ovaries not removed    EYE SURGERY      RK both eyes    OTHER ABDOMINAL SURGERY      laparoscopic surgery for adhesions    RETINAL DETACHMENT REPAIR         Social History     Tobacco Use    Smoking status: Never    Smokeless tobacco: Never   Substance Use Topics    Alcohol use: Yes     Alcohol/week: 0.6 oz     Types: 1 Cans of beer per week    Drug use: No        Family History   Problem Relation Age of Onset    Hypertension Mother     Hyperlipidemia Mother     Other Mother         thyroid disease s/p surgery    Cancer Father         throat; diagnosed late 50s    Diabetes Sister     Breast Cancer Sister     Other Sister         glaucoma    Other Sister         horseshoe kidney        Allergies as of 06/25/2024 - Reviewed 06/25/2024   Allergen Reaction Noted    Codeine  05/27/2009    Food  05/11/2017    Lactose  06/25/2024    Latex  05/11/2017         Current Outpatient Medications:     Ibuprofen 200 MG Cap, Take 1 Capsule by mouth., Disp: , Rfl:     acetaminophen (TYLENOL) 500 MG Tab, Take 500-1,000 mg by mouth every 6 hours as needed., Disp: , Rfl:     Review of Systems:  ROS       Physical Exam:  Vitals:    06/25/24 1502   BP: 130/74   BP Location: Left arm   Patient Position: Sitting   BP Cuff Size: Adult   Pulse: 85   Temp: 36.5 °C (97.7 °F)   TempSrc: Temporal   SpO2: 97%   Weight: 69.2 kg (152 lb 8.9 oz)   Height: 1.626 m (5' 4\")           DISTRESS LEVEL: no acute distress    Physical Exam  Constitutional:       Appearance: Normal appearance.   HENT:      Head: Normocephalic.      Mouth/Throat:      Mouth: Mucous membranes are moist.      Pharynx: Oropharynx is clear.   Eyes:      Extraocular Movements: Extraocular movements intact.      Conjunctiva/sclera: Conjunctivae normal.      Pupils: Pupils are equal, round, and reactive to light.   Cardiovascular:      Rate and Rhythm: Normal rate and regular rhythm.      " Pulses: Normal pulses.   Pulmonary:      Effort: Pulmonary effort is normal.      Breath sounds: Normal breath sounds.   Chest:      Comments: Fair cosmetic result after partial mastectomy in the right breast with lateral deformity.  There is fibrosis in the right axilla without adenopathy.  Both breasts are without masses nipple discharge or tenderness today.  Abdominal:      General: Abdomen is flat. Bowel sounds are normal.      Palpations: Abdomen is soft. There is no mass.   Musculoskeletal:         General: Normal range of motion.   Skin:     General: Skin is warm.   Neurological:      General: No focal deficit present.      Mental Status: She is alert and oriented to person, place, and time.   Psychiatric:         Mood and Affect: Mood normal.         Behavior: Behavior normal.          Labs:  No visits with results within 1 Month(s) from this visit.   Latest known visit with results is:   Hospital Outpatient Visit on 2018   Component Date Value Ref Range Status    Report 2018    Final                    Value:[facility]    Test Date:  2018  Pt Name:    OSIRIS GIMENEZ                 Department: EKG  MRN:        [id number]                      Room:  Gender:     Female                       Technician: [facility]  :        1955                   Requested By:MICHAEL J BLOCH  Order #:    [id number]                    [place] MD: Antonio Hill MD    Measurements  Intervals                                Axis  Rate:       74                           P:          5  AZ:         132                          QRS:        56  QRSD:       86                           T:          41  QT:         376  QTc:        418    Interpretive Statements  SINUS RHYTHM  Compared to ECG 2009 17:37:06  No significant changes    Electronically Signed On 2018 2:00:15 PST by Antonio Hill MD         Imaging:   All listed images below have been independently reviewed by me. I agree with the  findings as summarized below:    No results found.     Pathology:  Not available    Assessment & Plan:  1.  Bilateral metachronous breast cancer.  2017 left breast cancer status post partial mastectomy 2021 right breast cancer status post partial mastectomy.  Pathologic results not available.  She did not receive radiation chemo or endocrine therapy.  No evidence of recurrent disease.  2.  Intermittent right breast pain which is postoperative.  Recent mammogram and ultrasound May 24 is negative.    Plan: Will see back in 6 months for routine observation and follow-up.  We will repeat mammogram and ultrasound again in May 2025.  Will also attempt to get records from [place].        Any questions and concerns raised by the patient were answered to the best of my ability. Thank you for allowing me to participate in the care for this patient. Please feel free to contact me for any questions or concerns.     Rito Ott M.D.

## 2024-06-25 NOTE — ADDENDUM NOTE
Encounter addended by: Micha Vasquez, Med Ass't on: 6/25/2024 4:22 PM   Actions taken: Charge Capture section accepted

## 2025-01-08 ENCOUNTER — HOSPITAL ENCOUNTER (OUTPATIENT)
Dept: LAB | Facility: MEDICAL CENTER | Age: 70
End: 2025-01-08
Payer: MEDICARE

## 2025-01-08 ENCOUNTER — HOSPITAL ENCOUNTER (OUTPATIENT)
Dept: HEMATOLOGY ONCOLOGY | Facility: MEDICAL CENTER | Age: 70
End: 2025-01-08
Payer: MEDICARE

## 2025-01-08 VITALS
OXYGEN SATURATION: 96 % | HEIGHT: 64 IN | DIASTOLIC BLOOD PRESSURE: 80 MMHG | BODY MASS INDEX: 25.65 KG/M2 | SYSTOLIC BLOOD PRESSURE: 132 MMHG | WEIGHT: 150.24 LBS | HEART RATE: 74 BPM | TEMPERATURE: 97.5 F

## 2025-01-08 DIAGNOSIS — E55.9 VITAMIN D DEFICIENCY, UNSPECIFIED: ICD-10-CM

## 2025-01-08 DIAGNOSIS — Z78.0 POSTMENOPAUSAL: ICD-10-CM

## 2025-01-08 DIAGNOSIS — Z85.3 HISTORY OF RIGHT BREAST CANCER: ICD-10-CM

## 2025-01-08 DIAGNOSIS — Z85.3 HISTORY OF LEFT BREAST CANCER: ICD-10-CM

## 2025-01-08 LAB
25(OH)D3 SERPL-MCNC: 22 NG/ML (ref 30–100)
ALBUMIN SERPL BCP-MCNC: 4.7 G/DL (ref 3.2–4.9)
ALBUMIN/GLOB SERPL: 1.7 G/DL
ALP SERPL-CCNC: 108 U/L (ref 30–99)
ALT SERPL-CCNC: 24 U/L (ref 2–50)
ANION GAP SERPL CALC-SCNC: 11 MMOL/L (ref 7–16)
AST SERPL-CCNC: 22 U/L (ref 12–45)
BASOPHILS # BLD AUTO: 0.7 % (ref 0–1.8)
BASOPHILS # BLD: 0.04 K/UL (ref 0–0.12)
BILIRUB SERPL-MCNC: 0.3 MG/DL (ref 0.1–1.5)
BUN SERPL-MCNC: 18 MG/DL (ref 8–22)
CALCIUM ALBUM COR SERPL-MCNC: 8.6 MG/DL (ref 8.5–10.5)
CALCIUM SERPL-MCNC: 9.2 MG/DL (ref 8.5–10.5)
CEA SERPL-MCNC: 1 NG/ML (ref 0–3)
CHLORIDE SERPL-SCNC: 107 MMOL/L (ref 96–112)
CO2 SERPL-SCNC: 25 MMOL/L (ref 20–33)
CREAT SERPL-MCNC: 0.86 MG/DL (ref 0.5–1.4)
EOSINOPHIL # BLD AUTO: 0.17 K/UL (ref 0–0.51)
EOSINOPHIL NFR BLD: 3 % (ref 0–6.9)
ERYTHROCYTE [DISTWIDTH] IN BLOOD BY AUTOMATED COUNT: 42.9 FL (ref 35.9–50)
GFR SERPLBLD CREATININE-BSD FMLA CKD-EPI: 73 ML/MIN/1.73 M 2
GLOBULIN SER CALC-MCNC: 2.7 G/DL (ref 1.9–3.5)
GLUCOSE SERPL-MCNC: 83 MG/DL (ref 65–99)
HCT VFR BLD AUTO: 44.3 % (ref 37–47)
HGB BLD-MCNC: 14.2 G/DL (ref 12–16)
IMM GRANULOCYTES # BLD AUTO: 0.01 K/UL (ref 0–0.11)
IMM GRANULOCYTES NFR BLD AUTO: 0.2 % (ref 0–0.9)
LYMPHOCYTES # BLD AUTO: 1.71 K/UL (ref 1–4.8)
LYMPHOCYTES NFR BLD: 30.5 % (ref 22–41)
MCH RBC QN AUTO: 30.2 PG (ref 27–33)
MCHC RBC AUTO-ENTMCNC: 32.1 G/DL (ref 32.2–35.5)
MCV RBC AUTO: 94.3 FL (ref 81.4–97.8)
MONOCYTES # BLD AUTO: 0.46 K/UL (ref 0–0.85)
MONOCYTES NFR BLD AUTO: 8.2 % (ref 0–13.4)
NEUTROPHILS # BLD AUTO: 3.21 K/UL (ref 1.82–7.42)
NEUTROPHILS NFR BLD: 57.4 % (ref 44–72)
NRBC # BLD AUTO: 0 K/UL
NRBC BLD-RTO: 0 /100 WBC (ref 0–0.2)
PLATELET # BLD AUTO: 297 K/UL (ref 164–446)
PMV BLD AUTO: 8.8 FL (ref 9–12.9)
POTASSIUM SERPL-SCNC: 4.2 MMOL/L (ref 3.6–5.5)
PROT SERPL-MCNC: 7.4 G/DL (ref 6–8.2)
RBC # BLD AUTO: 4.7 M/UL (ref 4.2–5.4)
SODIUM SERPL-SCNC: 143 MMOL/L (ref 135–145)
WBC # BLD AUTO: 5.6 K/UL (ref 4.8–10.8)

## 2025-01-08 PROCEDURE — 82378 CARCINOEMBRYONIC ANTIGEN: CPT

## 2025-01-08 PROCEDURE — 99214 OFFICE O/P EST MOD 30 MIN: CPT

## 2025-01-08 PROCEDURE — 82306 VITAMIN D 25 HYDROXY: CPT

## 2025-01-08 PROCEDURE — 86300 IMMUNOASSAY TUMOR CA 15-3: CPT

## 2025-01-08 PROCEDURE — 99212 OFFICE O/P EST SF 10 MIN: CPT

## 2025-01-08 PROCEDURE — 85025 COMPLETE CBC W/AUTO DIFF WBC: CPT

## 2025-01-08 PROCEDURE — 80053 COMPREHEN METABOLIC PANEL: CPT

## 2025-01-08 PROCEDURE — 36415 COLL VENOUS BLD VENIPUNCTURE: CPT

## 2025-01-08 ASSESSMENT — ENCOUNTER SYMPTOMS
GASTROINTESTINAL NEGATIVE: 1
EYES NEGATIVE: 1
RESPIRATORY NEGATIVE: 1
PSYCHIATRIC NEGATIVE: 1
CONSTITUTIONAL NEGATIVE: 1
NEUROLOGICAL NEGATIVE: 1
MUSCULOSKELETAL NEGATIVE: 1
CARDIOVASCULAR NEGATIVE: 1

## 2025-01-08 ASSESSMENT — PAIN SCALES - GENERAL: PAINLEVEL_OUTOF10: NO PAIN

## 2025-01-08 NOTE — ADDENDUM NOTE
72 hour holter placed per Dr. warner's orders.  Dr. millan to interpret.  Patient instructed on use and verbalized understanding and consented.  Will return as signed and agreed.     Encounter addended by: Kaelyn Patricio, Med Ass't on: 1/8/2025 2:08 PM   Actions taken: Charge Capture section accepted

## 2025-01-08 NOTE — PROGRESS NOTES
Consult:  Hematology/Oncology      Referring Physician: Dagoberto Lu PA-C  Primary Care:  Pcp Pt States None    Diagnosis: Bilateral metachronous breast cancer on observation    Chief Complaint: Bilateral metachronous breast cancer on observation    History of Presenting Illness:  María Colindres is a 68 y.o. female who had a left breast cancer in 2017 with Dr. Martin treated with partial mastectomy and then had a right breast cancer in 2021 treated with partial mastectomy and lymph node dissection in [facility].  Margins were involved and she required 3 surgeries to clear the margins.  The details of the pathology are not available to me today.  She did not have any radiation, chemotherapy or endocrine therapy subsequently.  She has had persistent pain in the right breast.  Her recent diagnostic mammogram and ultrasound showed scar tissue only.  She has moved back to [place] and wishes to establish oncologic care here.  Health is good for age.  She has no symptoms referable to local or metastatic recurrence.    1/8/2025 interval history (Rosalba Paniagua, JOSTIN): Patient returns to clinic today for follow-up appointment.  Patient reports feeling well, with good appetite and energy levels.Patient denies any changes in her breast, including any new lumps or masses, skin dimpling, puckering, or erythema, nipple retraction or discharge.  She does continue to have pain in her right axilla, however she states this is slowly improving.  She reports that her health overall is good, however she admits she has not been following regularly with her PCP.  She states that she does exercise regularly, is a non-smoker, and rarely has an alcoholic beverage.  She is up-to-date with her screening colonoscopy.  She has not pursued genetic testing and does not wish to do so at this time.  She recalls that her right breast cancer was stage IIIa, and states that she did receive radiation to the right breast after surgery.  She again  denies any use of chemotherapy or endocrine therapy following radiation.      Treatment history:  2017 left breast partial mastectomy  2021 right breast partial mastectomy and lymph node dissection    Review of Systems:  Review of Systems   Constitutional: Negative.    HENT:  Positive for congestion (Reports recent URI, now almost fully recovered.).    Eyes: Negative.    Respiratory: Negative.     Cardiovascular: Negative.    Gastrointestinal: Negative.    Genitourinary: Negative.    Musculoskeletal: Negative.    Skin: Negative.    Neurological: Negative.    Endo/Heme/Allergies: Negative.    Psychiatric/Behavioral: Negative.            Past Medical History:   Diagnosis Date    Abnormal mammogram 7/09    microcalcifications - atypical ductal hyperplasia    Arthritis 5-2017    back, hands, neck    Cataract     Chronic low back pain     Chronic neck pain     Dyslipidemia     Hepatic steatosis 11/15/2017    Seborrheic keratoses, inflamed 12/13/2017     Past Surgical History:   Procedure Laterality Date    TOE FUSION Right 5/15/2017    Procedure: TOE FUSION - 1ST AND 2ND TARSOMETATARSAL ;  Surgeon: Andrade Harrison M.D.;  Location: SURGERY [facility];  Service:     LIGAMENT REPAIR  5/15/2017    Procedure: LIGAMENT REPAIR OPEN REDUCTION LIS FRANC LIGAMENT;  Surgeon: Andrade Harrison M.D.;  Location: [facility];  Service:     BREAST BIOPSY  7/8/2009    Performed by TATYANA MCKEE at SURGERY SAME DAY [facility] ORS    CERVICAL DISK AND FUSION ANTERIOR  2/18/2009    Performed by RIVERA HERNANDEZ at SURGERY [facility] ORS    COLONOSCOPY  2004    hemorrhoids    ABDOMINAL HYSTERECTOMY TOTAL      due to fibroids, tubes and ovaries not removed    EYE SURGERY      RK both eyes    OTHER ABDOMINAL SURGERY      laparoscopic surgery for adhesions    RETINAL DETACHMENT REPAIR       Social History     Tobacco Use    Smoking status: Never    Smokeless tobacco: Never   Substance Use Topics    Alcohol use: Yes      "Alcohol/week: 0.6 oz     Types: 1 Cans of beer per week    Drug use: No      Family History   Problem Relation Age of Onset    Hypertension Mother     Hyperlipidemia Mother     Other Mother         thyroid disease s/p surgery    Cancer Father         throat; diagnosed late 50s    Diabetes Sister     Breast Cancer Sister     Other Sister         glaucoma    Other Sister         horseshoe kidney      Allergies as of 01/08/2025 - Reviewed 01/08/2025   Allergen Reaction Noted    Codeine  05/27/2009    Food  05/11/2017    Lactose  06/25/2024    Latex  05/11/2017     Current Outpatient Medications:     Ibuprofen 200 MG Cap, Take 1 Capsule by mouth., Disp: , Rfl:     acetaminophen (TYLENOL) 500 MG Tab, Take 500-1,000 mg by mouth every 6 hours as needed., Disp: , Rfl:       Physical Exam:  /80 (BP Location: Left arm, Patient Position: Sitting, BP Cuff Size: Adult)   Pulse 74   Temp 36.4 °C (97.5 °F) (Temporal)   Ht 1.626 m (5' 4.02\")   Wt 68.2 kg (150 lb 3.9 oz)   SpO2 96%   BMI 25.78 kg/m²       Physical Exam  Constitutional:       Appearance: Normal appearance. She is normal weight.   HENT:      Head: Normocephalic and atraumatic.      Nose: Nose normal.      Mouth/Throat:      Mouth: Mucous membranes are moist.      Pharynx: Oropharynx is clear.   Eyes:      Extraocular Movements: Extraocular movements intact.      Conjunctiva/sclera: Conjunctivae normal.   Cardiovascular:      Rate and Rhythm: Normal rate and regular rhythm.      Pulses: Normal pulses.      Heart sounds: Normal heart sounds.   Pulmonary:      Effort: Pulmonary effort is normal.      Breath sounds: Normal breath sounds.   Chest:      Comments: 1/8/2025 well-healed surgical scars bilaterally with fair cosmetic result after partial mastectomy in the right breast.  Radiation tattoo still present on sternum.  No palpable lumps or masses, no skin puckering or dimpling, and no nipple inversion or discharge noted in either breast.  6/25/2024 Fair " cosmetic result after partial mastectomy in the right breast with lateral deformity.  There is fibrosis in the right axilla without adenopathy.  Both breasts are without masses nipple discharge or tenderness today.  Abdominal:      General: Abdomen is flat. Bowel sounds are normal.      Palpations: Abdomen is soft. There is no mass.   Musculoskeletal:         General: Normal range of motion.      Cervical back: Normal range of motion and neck supple.   Lymphadenopathy:      Cervical: No cervical adenopathy.      Upper Body:      Right upper body: No supraclavicular or axillary adenopathy.      Left upper body: No supraclavicular or axillary adenopathy.      Lower Body: No right inguinal adenopathy. No left inguinal adenopathy.   Skin:     General: Skin is warm.   Neurological:      General: No focal deficit present.      Mental Status: She is alert and oriented to person, place, and time.   Psychiatric:         Mood and Affect: Mood normal.         Behavior: Behavior normal.          Labs:        [id number] Bilateral tomosynthesis diagnostic mammography   1.  No change since prior mammogram.  2.  No mammographic or sonographic evidence of malignancy.  3.  The patient was instructed that if pain worsens, or any palpable abnormalities develops, to seek further evaluation from her physician.  Additional imaging can be performed at that time if clinically indicated.  Otherwise recommend annual screening   mammography.  R2 - CATEGORY 2: BENIGN FINDING(S)    Pathology:  Not available    Assessment & Plan:  1.  Bilateral metachronous breast cancer.   left breast cancer status post partial mastectomy  right breast cancer status post partial mastectomy.  Pathologic results not available.  Patient recalls right breast cancer being a stage III.  She did receive radiation to the right breast following her lumpectomy, however she did decline chemotherapy/endocrine therapy.  No evidence of recurrent disease.   Bilateral diagnostic mammogram done 5/13/2024 BI-RADS Category 2 benign.  2. Overdue for Dexa scan.    Plan: Plan for patient to continue on observation.  Next bilateral diagnostic mammogram due 5/14/2025.  Encourage patient to have DEXA scan done at the same time.  Labs today for further evaluation.  Return to clinic in 6 months for next visit.    Reviewed with patient strategies to help reduce the risk of breast cancer recurrence, including achieving/maintaining a healthy BMI, limiting alcoholic intake, and complete abstinence from use of tobacco products.  Also reviewed with patient signs and symptoms for which to urgently contact the clinic, including the development of any new lumps or masses, any skin puckering,dimpling or textural changes, any new nipple inversion, or any nipple discharge.     Reviewed with patient strategies to maintain bone density, including daily calcium intake of 1200 mg and vitamin D supplement of 2000 IU.  Patient also instructed to engage in at least 30 minutes of weightbearing activity daily.     Plan of care reviewed with patient and all questions answered.  Patient verbalizes understanding, denies questions, and agrees with plan of care.  Patient instructed to call or message clinic with any questions or concerns, contact information provided.    Thank you for allowing me the privilege of caring for this patient.  If if you have any questions, please do not hesitate to reach out.  I may be contacted at [phone].    Rosalba Paniagua, MSN, AOCNP, FNP-C    Please note that this dictation was created using voice recognition software. I have made every reasonable attempt to correct obvious errors, but I expect that there are errors of grammar, and possibly content, that I did not discover before finalizing the note.

## 2025-01-10 LAB — CANCER AG27-29 SERPL-ACNC: 25.9 U/ML

## 2025-05-15 ENCOUNTER — HOSPITAL ENCOUNTER (OUTPATIENT)
Dept: RADIOLOGY | Facility: MEDICAL CENTER | Age: 70
End: 2025-05-15
Payer: MEDICARE

## 2025-05-15 DIAGNOSIS — Z78.0 POSTMENOPAUSAL: ICD-10-CM

## 2025-05-15 DIAGNOSIS — Z85.3 HISTORY OF LEFT BREAST CANCER: ICD-10-CM

## 2025-05-15 DIAGNOSIS — E55.9 VITAMIN D DEFICIENCY, UNSPECIFIED: ICD-10-CM

## 2025-05-15 DIAGNOSIS — Z85.3 HISTORY OF RIGHT BREAST CANCER: ICD-10-CM

## 2025-05-15 PROCEDURE — G0279 TOMOSYNTHESIS, MAMMO: HCPCS

## 2025-05-15 PROCEDURE — 77080 DXA BONE DENSITY AXIAL: CPT

## 2025-05-19 ENCOUNTER — TELEPHONE (OUTPATIENT)
Dept: HEMATOLOGY ONCOLOGY | Facility: MEDICAL CENTER | Age: 70
End: 2025-05-19
Payer: MEDICARE

## 2025-05-19 NOTE — TELEPHONE ENCOUNTER
VM left for patient to return call to review results of bone density and mammogram. Waiting on call back.

## 2025-07-09 ENCOUNTER — HOSPITAL ENCOUNTER (OUTPATIENT)
Facility: MEDICAL CENTER | Age: 70
End: 2025-07-09
Attending: INTERNAL MEDICINE
Payer: MEDICARE

## 2025-07-09 VITALS
RESPIRATION RATE: 14 BRPM | SYSTOLIC BLOOD PRESSURE: 126 MMHG | HEART RATE: 73 BPM | TEMPERATURE: 97 F | OXYGEN SATURATION: 96 % | WEIGHT: 153.55 LBS | BODY MASS INDEX: 26.21 KG/M2 | DIASTOLIC BLOOD PRESSURE: 70 MMHG | HEIGHT: 64 IN

## 2025-07-09 DIAGNOSIS — Z85.3 HISTORY OF RIGHT BREAST CANCER: Primary | ICD-10-CM

## 2025-07-09 DIAGNOSIS — Z08 ENCOUNTER FOR FOLLOW-UP SURVEILLANCE OF BREAST CANCER: ICD-10-CM

## 2025-07-09 DIAGNOSIS — Z85.3 HISTORY OF LEFT BREAST CANCER: ICD-10-CM

## 2025-07-09 DIAGNOSIS — Z85.3 ENCOUNTER FOR FOLLOW-UP SURVEILLANCE OF BREAST CANCER: ICD-10-CM

## 2025-07-09 PROCEDURE — 99212 OFFICE O/P EST SF 10 MIN: CPT | Performed by: NURSE PRACTITIONER

## 2025-07-09 PROCEDURE — 99214 OFFICE O/P EST MOD 30 MIN: CPT | Performed by: NURSE PRACTITIONER

## 2025-07-09 ASSESSMENT — ENCOUNTER SYMPTOMS
FEVER: 0
DIAPHORESIS: 1
NAUSEA: 0
CONSTIPATION: 0
CHILLS: 0
COUGH: 0
SHORTNESS OF BREATH: 0
VOMITING: 0
MYALGIAS: 0
DIARRHEA: 1
PALPITATIONS: 0
DIZZINESS: 1
WEIGHT LOSS: 0
HEADACHES: 0

## 2025-07-09 ASSESSMENT — FIBROSIS 4 INDEX: FIB4 SCORE: 1.04

## 2025-07-09 ASSESSMENT — PAIN SCALES - GENERAL: PAINLEVEL_OUTOF10: 3=SLIGHT PAIN

## 2025-07-09 NOTE — ADDENDUM NOTE
Encounter addended by: Jennifer Fregoso, Med Ass't on: 7/9/2025 2:06 PM   Actions taken: Charge Capture section accepted

## 2025-07-09 NOTE — PROGRESS NOTES
Subjective     María Colindres is a 69 y.o. female who presents with Cancer ([place]/  Bilateral metachronous breast cancer on observation/ 6 month FV)          HPI    Referring Physician: Dagoberto Lu PA-C   Primary Care:  None     Diagnosis: Bilateral metachronous breast cancer     Chief Complaint: Patient seen today for evaluation of her Bilateral metachronous breast cancer, for continued monitoring of symptoms and side effects of cancer treatments, employing observation only.    Oncology history of presenting illness:  María Colindres is a 68 y.o. female who had a left breast cancer in 2017 with Dr. Martin treated with partial mastectomy and then had a right breast cancer in 2021 treated with partial mastectomy and lymph node dissection in [facility]. Margins were involved and she required 3 surgeries to clear the margins. The details of the pathology are not available to me today. She did not have any radiation, chemotherapy or endocrine therapy subsequently. She has had persistent pain in the right breast. Her recent diagnostic mammogram and ultrasound showed scar tissue only. She has moved back to [place] and wishes to establish oncologic care here. Health is good for age. She has no symptoms referable to local or metastatic recurrence.     Interval histories:  1/8/2025 interval history (Rosalba Paniagua, AOADI): Patient returns to clinic today for follow-up appointment.  Patient reports feeling well, with good appetite and energy levels.Patient denies any changes in her breast, including any new lumps or masses, skin dimpling, puckering, or erythema, nipple retraction or discharge.  She does continue to have pain in her right axilla, however she states this is slowly improving.  She reports that her health overall is good, however she admits she has not been following regularly with her PCP.  She states that she does exercise regularly, is a non-smoker, and rarely has an alcoholic beverage.  She is  "up-to-date with her screening colonoscopy.  She has not pursued genetic testing and does not wish to do so at this time.  She recalls that her right breast cancer was stage IIIa, and states that she did receive radiation to the right breast after surgery.  She again denies any use of chemotherapy or endocrine therapy following radiation.     Interval history 07/09/25 ([place], APRN):  Patient doing well overall.  She has been noticing a little bit more pain in that right axillar area.  Physical examination did not yield any abnormal findings.  She has noticed some increased fatigue and does have some hot flashes every once in a while.  She also has vertigo which she states is stable and not worsening.  She does state that she has diarrhea maybe once a week and she is thinking this might be lactose induced    Treatment history:  2017: Left breast partial mastectomy - Dr. Irvin  2021: Right breast partial mastectomy and lymph node dissection - ELIZABETH Mayes    Allergies[1]    Medications Ordered Prior to Encounter[2]    Review of Systems   Constitutional:  Positive for diaphoresis (sometimes will get hot flashes about once per week) and malaise/fatigue. Negative for chills, fever and weight loss.   Respiratory:  Negative for cough and shortness of breath.    Cardiovascular:  Negative for chest pain and palpitations.   Gastrointestinal:  Positive for diarrhea (intermittent - may be diet related). Negative for constipation, nausea and vomiting.   Genitourinary:  Negative for dysuria.   Musculoskeletal:  Negative for myalgias.   Neurological:  Positive for dizziness (vertigo - stable). Negative for headaches.              Objective     /70   Pulse 73   Temp 36.1 °C (97 °F) (Temporal)   Resp 14   Ht 1.626 m (5' 4.02\")   Wt 69.7 kg (153 lb 8.8 oz)   SpO2 96%   BMI 26.34 kg/m²      Physical Exam  Vitals reviewed.   Constitutional:       General: She is not in acute distress.     Appearance: Normal appearance. She " is not diaphoretic.   HENT:      Head: Normocephalic and atraumatic.   Cardiovascular:      Rate and Rhythm: Normal rate and regular rhythm.      Heart sounds: Normal heart sounds. No murmur heard.     No friction rub. No gallop.   Pulmonary:      Effort: Pulmonary effort is normal. No respiratory distress.      Breath sounds: Normal breath sounds. No wheezing.   Chest:      Comments: 07/09/25: No change to cosmetic results of right breast. Tenderness noted in the right axilla with no clinical abnormalities. Left breast unremarkable.   1/8/2025 well-healed surgical scars bilaterally with fair cosmetic result after partial mastectomy in the right breast.  Radiation tattoo still present on sternum.  No palpable lumps or masses, no skin puckering or dimpling, and no nipple inversion or discharge noted in either breast.  6/25/2024 Fair cosmetic result after partial mastectomy in the right breast with lateral deformity.  There is fibrosis in the right axilla without adenopathy.  Both breasts are without masses nipple discharge or tenderness today.  Abdominal:      General: Bowel sounds are normal. There is no distension.      Palpations: Abdomen is soft.      Tenderness: There is no abdominal tenderness.   Musculoskeletal:         General: No swelling or tenderness. Normal range of motion.   Skin:     General: Skin is warm and dry.   Neurological:      Mental Status: She is alert.   Psychiatric:         Mood and Affect: Mood normal.         Behavior: Behavior normal.            MA-DIAGNOSTIC MAMMO BILAT W/TOMOSYNTHESIS W/CAD   05/15/25  IMPRESSION:     1.  Post treatment change in the right breast with no mammographic evidence of malignancy bilaterally.  2.  Recommend annual follow-up mammography, monthly self breast exam and routine clinical evaluation.     These results were given to the patient at the time of visit.     BI-RADS Category: R2 - CATEGORY 2: BENIGN FINDING(S)        DS-BONE DENSITY STUDY (DEXA)    05/15/25  IMPRESSION:     According to the World Health Organization classification, bone mineral density of this patient is osteopenic in the spine and normal in the proximal left femur.           Assessment & Plan     1. History of right breast cancer        2. History of left breast cancer        3. Encounter for follow-up surveillance of breast cancer                Assessment & Plan:  1.  Bilateral metachronous breast cancer.  2017 left breast cancer status post partial mastectomy 2021 right breast cancer status post partial mastectomy.  Pathologic results not available.  Patient recalls right breast cancer being a stage III.  She did receive radiation to the right breast following her lumpectomy, however she did decline chemotherapy/endocrine therapy.  No evidence of recurrent disease.  Bilateral diagnostic mammogram done 5/15/2025 BI-RADS Category 2 benign. Next due May 2026.   2. Dexa scan completed 5/15/25 - Osteopenia in the spine and normal in the left femur.  Encouraged vitamin D and calcium supplements.  Patient is only taking vitamin D.  We discussed combination supplements.  Next DEXA scan would be due in May 2027.  3.  Tenderness in the right axilla.  Unremarkable examination today and most recent mammogram was within normal limits.  Discussed with patient possible referral to lymphedema specialist/physical therapy to see if they can help assist with this.  Patient is uncertain and would like to think about it.  4.  Patient with some diarrhea noted.  She believes it might be related to lactose.  She will make some diet changes to see if that helps.  She did states she was seen by GI and has had a colonoscopy in the past.  We discussed possible referral to GI if persists.  She will contact office if needed.      Plan:  Will continue to follow and monitor patient.  Will have her follow-up in the clinic in 6 months, or sooner if needed.      Please note that this dictation was created using voice  recognition software. I have made every reasonable attempt to correct obvious errors, but I expect that there are errors of grammar and possibly content that I did not discover before finalizing the note.                 [1]   Allergies  Allergen Reactions    Codeine      nausea    Food      Wine: severe headache/migraine    Lactose      DIARRHEA    Latex      From previous surgery; possible adhesions   [2]   Current Outpatient Medications on File Prior to Encounter   Medication Sig Dispense Refill    Ibuprofen 200 MG Cap Take 1 Capsule by mouth.      acetaminophen (TYLENOL) 500 MG Tab Take 500-1,000 mg by mouth every 6 hours as needed.       No current facility-administered medications on file prior to encounter.

## (undated) DEVICE — CANISTER SUCTION 3000ML MECHANICAL FILTER AUTO SHUTOFF MEDI-VAC NONSTERILE LF DISP  (40EA/CA)

## (undated) DEVICE — DRAPE LARGE 3 QUARTER - (20/CA)

## (undated) DEVICE — LEAD SET 6 DISP. EKG NIHON KOHDEN (100EA/CA) [9859].

## (undated) DEVICE — STOCKINET BIAS 6 IN STERILE - (20/CA)

## (undated) DEVICE — GLOVE BIOGEL PI ORTHO SZ 6 SURGICAL PF LF (40PR/BX)

## (undated) DEVICE — GOWN SURGEONS X-LARGE - DISP. (30/CA)

## (undated) DEVICE — STOCKINET TUBULAR 6IN STERILE - 6 X 48YDS (25/CA)

## (undated) DEVICE — GLOVE BIOGEL PI INDICATOR SZ 7.0 SURGICAL PF LF - (50/BX 4BX/CA)

## (undated) DEVICE — GLOVE SZ 8 BIOGEL PI MICRO - PF LF (50PR/BX)

## (undated) DEVICE — DRAPE LOWER EXTREMETY - (6/CA)

## (undated) DEVICE — PACK LOWER EXTREMITY - (2/CA)

## (undated) DEVICE — SUTURE GENERAL

## (undated) DEVICE — KIT ANESTHESIA W/CIRCUIT & 3/LT BAG W/FILTER (20EA/CA)

## (undated) DEVICE — SUTURE 3-0 ETHILON PS-1 (36PK/BX)

## (undated) DEVICE — DRAPE C-ARM LARGE 41IN X 74 IN - (10/BX 2BX/CA)

## (undated) DEVICE — DRESSING ABDOMINAL PAD STERILE 8 X 10" (360EA/CA)"

## (undated) DEVICE — GLOVE SZ 6.5 BIOGEL PI MICRO - PF LF (50PR/BX)

## (undated) DEVICE — PROTECTOR ULNA NERVE - (36PR/CA)

## (undated) DEVICE — MASK ANESTHESIA ADULT  - (100/CA)

## (undated) DEVICE — GLOVE SZ 7.5 BIOGEL PI MICRO - PF LF (50PR/BX)

## (undated) DEVICE — SET EXTENSION WITH 2 PORTS (48EA/CA) ***PART #2C8610 IS A SUBSTITUTE*****

## (undated) DEVICE — WRAP CO-FLEX 4IN X 5YD STERIL - SELF-ADHERENT (18/CA)

## (undated) DEVICE — GLOVE BIOGEL PI INDICATOR SZ 8.0 SURGICAL PF LF -(50/BX 4BX/CA)

## (undated) DEVICE — DRESSING 3X8 ADAPTIC GAUZE - NON-ADHERING (36/PK 6PK/BX)

## (undated) DEVICE — GLOVE BIOGEL PI ORTHO SZ 6 1/2 SURGICAL PF LF (40PR/BX)

## (undated) DEVICE — TUBING CLEARLINK DUO-VENT - C-FLO (48EA/CA)

## (undated) DEVICE — SODIUM CHL IRRIGATION 0.9% 1000ML (12EA/CA)

## (undated) DEVICE — Device

## (undated) DEVICE — SUTURE 3-0 VICRYL PLUS SH - 8X 18 INCH (12/BX)

## (undated) DEVICE — GOWN SURGICAL XX-LARGE - (28EA/CA) SIRUS NON REINFORCED

## (undated) DEVICE — SET LEADWIRE 5 LEAD BEDSIDE DISPOSABLE ECG (1SET OF 5/EA)

## (undated) DEVICE — WIRE DRILL TIP 1.6MM X 150MM (5TX6=30) (6EA/BX)

## (undated) DEVICE — SPLINT PLASTER 5 IN X 30 IN - (50EA/BX 6BX/CA)

## (undated) DEVICE — KIT ROOM DECONTAMINATION

## (undated) DEVICE — BLOCK

## (undated) DEVICE — BIT DRILL 2.7MM 130MM (2TX2=4)

## (undated) DEVICE — GOWN WARMING STANDARD FLEX - (30/CA)

## (undated) DEVICE — HEAD HOLDER JUNIOR/ADULT

## (undated) DEVICE — PADDING CAST 6 IN STERILE - 6 X 4 YDS (24/CA)

## (undated) DEVICE — MASK, LARYNGEAL AIRWAY #4

## (undated) DEVICE — CHLORAPREP 26 ML APPLICATOR - ORANGE TINT(25/CA)

## (undated) DEVICE — SLEEVE, VASO, THIGH, MED

## (undated) DEVICE — GLOVE BIOGEL SZ 7.5 SURGICAL PF LTX - (50PR/BX 4BX/CA)

## (undated) DEVICE — BLADE SAGITTAL SAW 9.4MM X 25.5MM X .4MM FINE TOOTH (1/EA)

## (undated) DEVICE — ELECTRODE 850 FOAM ADHESIVE - HYDROGEL RADIOTRNSPRNT (50/PK)

## (undated) DEVICE — LACTATED RINGERS INJ 1000 ML - (14EA/CA 60CA/PF)

## (undated) DEVICE — NEPTUNE 4 PORT MANIFOLD - (20/PK)

## (undated) DEVICE — ELECTRODE DUAL RETURN W/ CORD - (50/PK)

## (undated) DEVICE — GLOVE BIOGEL PI INDICATOR SZ 7.5 SURGICAL PF LF -(50/BX 4BX/CA)

## (undated) DEVICE — SENSOR SPO2 NEO LNCS ADHESIVE (20/BX) SEE USER NOTES

## (undated) DEVICE — SUCTION INSTRUMENT YANKAUER BULBOUS TIP W/O VENT (50EA/CA)

## (undated) DEVICE — BLADE SURGICAL #15 - (50/BX 3BX/CA)